# Patient Record
Sex: FEMALE | Race: WHITE | NOT HISPANIC OR LATINO | Employment: OTHER | URBAN - METROPOLITAN AREA
[De-identification: names, ages, dates, MRNs, and addresses within clinical notes are randomized per-mention and may not be internally consistent; named-entity substitution may affect disease eponyms.]

---

## 2018-04-26 ENCOUNTER — DOCUMENTATION (OUTPATIENT)
Dept: ONCOLOGY | Facility: CLINIC | Age: 68
End: 2018-04-26

## 2018-04-26 ENCOUNTER — APPOINTMENT (OUTPATIENT)
Dept: OTHER | Facility: HOSPITAL | Age: 68
End: 2018-04-26

## 2018-04-26 ENCOUNTER — CONSULT (OUTPATIENT)
Dept: ONCOLOGY | Facility: CLINIC | Age: 68
End: 2018-04-26

## 2018-04-26 VITALS
HEIGHT: 63 IN | SYSTOLIC BLOOD PRESSURE: 108 MMHG | HEART RATE: 76 BPM | WEIGHT: 287.9 LBS | TEMPERATURE: 97.7 F | DIASTOLIC BLOOD PRESSURE: 74 MMHG | OXYGEN SATURATION: 93 % | BODY MASS INDEX: 51.01 KG/M2 | RESPIRATION RATE: 18 BRPM

## 2018-04-26 DIAGNOSIS — K95.89 IRON DEFICIENCY ANEMIA FOLLOWING BARIATRIC SURGERY: ICD-10-CM

## 2018-04-26 DIAGNOSIS — D50.8 IRON DEFICIENCY ANEMIA FOLLOWING BARIATRIC SURGERY: ICD-10-CM

## 2018-04-26 DIAGNOSIS — D64.9 ANEMIA, UNSPECIFIED TYPE: Primary | ICD-10-CM

## 2018-04-26 DIAGNOSIS — E53.8 B12 DEFICIENCY: ICD-10-CM

## 2018-04-26 DIAGNOSIS — Z86.718 HISTORY OF DVT IN ADULTHOOD: Primary | ICD-10-CM

## 2018-04-26 LAB
ALBUMIN SERPL-MCNC: 3.8 G/DL (ref 3.5–5.2)
ALBUMIN/GLOB SERPL: 1.5 G/DL
ALP SERPL-CCNC: 90 U/L (ref 39–117)
ALT SERPL W P-5'-P-CCNC: 23 U/L (ref 1–33)
ANION GAP SERPL CALCULATED.3IONS-SCNC: 10.2 MMOL/L
AST SERPL-CCNC: 28 U/L (ref 1–32)
BASOPHILS # BLD AUTO: 0.01 10*3/MM3 (ref 0–0.2)
BASOPHILS NFR BLD AUTO: 0.2 % (ref 0–1.5)
BILIRUB SERPL-MCNC: 0.3 MG/DL (ref 0.1–1.2)
BUN BLD-MCNC: 23 MG/DL (ref 8–23)
BUN/CREAT SERPL: 20.5 (ref 7–25)
CALCIUM SPEC-SCNC: 8.9 MG/DL (ref 8.6–10.5)
CHLORIDE SERPL-SCNC: 103 MMOL/L (ref 98–107)
CO2 SERPL-SCNC: 27.8 MMOL/L (ref 22–29)
CREAT BLD-MCNC: 1.12 MG/DL (ref 0.57–1)
DEPRECATED RDW RBC AUTO: 45.6 FL (ref 37–54)
EOSINOPHIL # BLD AUTO: 0.08 10*3/MM3 (ref 0–0.7)
EOSINOPHIL NFR BLD AUTO: 2 % (ref 0.3–6.2)
ERYTHROCYTE [DISTWIDTH] IN BLOOD BY AUTOMATED COUNT: 13.3 % (ref 11.7–13)
FERRITIN SERPL-MCNC: 101.2 NG/ML (ref 13–150)
FOLATE SERPL-MCNC: 9.02 NG/ML (ref 4.78–24.2)
GFR SERPL CREATININE-BSD FRML MDRD: 49 ML/MIN/1.73
GLOBULIN UR ELPH-MCNC: 2.6 GM/DL
GLUCOSE BLD-MCNC: 62 MG/DL (ref 65–99)
HCT VFR BLD AUTO: 39.8 % (ref 35.6–45.5)
HGB BLD-MCNC: 12.9 G/DL (ref 11.9–15.5)
IMM GRANULOCYTES # BLD: 0.02 10*3/MM3 (ref 0–0.03)
IMM GRANULOCYTES NFR BLD: 0.5 % (ref 0–0.5)
IRON 24H UR-MRATE: 87 MCG/DL (ref 37–145)
IRON SATN MFR SERPL: 24 % (ref 20–50)
LYMPHOCYTES # BLD AUTO: 1.1 10*3/MM3 (ref 0.9–4.8)
LYMPHOCYTES NFR BLD AUTO: 26.9 % (ref 19.6–45.3)
MCH RBC QN AUTO: 29.9 PG (ref 26.9–32)
MCHC RBC AUTO-ENTMCNC: 32.4 G/DL (ref 32.4–36.3)
MCV RBC AUTO: 92.1 FL (ref 80.5–98.2)
MONOCYTES # BLD AUTO: 0.28 10*3/MM3 (ref 0.2–1.2)
MONOCYTES NFR BLD AUTO: 6.8 % (ref 5–12)
NEUTROPHILS # BLD AUTO: 2.6 10*3/MM3 (ref 1.9–8.1)
NEUTROPHILS NFR BLD AUTO: 63.6 % (ref 42.7–76)
NRBC BLD MANUAL-RTO: 0 /100 WBC (ref 0–0)
PLATELET # BLD AUTO: 237 10*3/MM3 (ref 140–500)
PMV BLD AUTO: 10.9 FL (ref 6–12)
POTASSIUM BLD-SCNC: 4.3 MMOL/L (ref 3.5–5.2)
PROT SERPL-MCNC: 6.4 G/DL (ref 6–8.5)
RBC # BLD AUTO: 4.32 10*6/MM3 (ref 3.9–5.2)
SODIUM BLD-SCNC: 141 MMOL/L (ref 136–145)
TIBC SERPL-MCNC: 361 MCG/DL (ref 298–536)
TRANSFERRIN SERPL-MCNC: 242 MG/DL (ref 200–360)
VIT B12 BLD-MCNC: 302 PG/ML (ref 211–946)
WBC NRBC COR # BLD: 4.09 10*3/MM3 (ref 4.5–10.7)

## 2018-04-26 PROCEDURE — 84466 ASSAY OF TRANSFERRIN: CPT | Performed by: INTERNAL MEDICINE

## 2018-04-26 PROCEDURE — 85025 COMPLETE CBC W/AUTO DIFF WBC: CPT | Performed by: INTERNAL MEDICINE

## 2018-04-26 PROCEDURE — 36415 COLL VENOUS BLD VENIPUNCTURE: CPT

## 2018-04-26 PROCEDURE — 83540 ASSAY OF IRON: CPT | Performed by: INTERNAL MEDICINE

## 2018-04-26 PROCEDURE — 82746 ASSAY OF FOLIC ACID SERUM: CPT | Performed by: INTERNAL MEDICINE

## 2018-04-26 PROCEDURE — 82728 ASSAY OF FERRITIN: CPT | Performed by: INTERNAL MEDICINE

## 2018-04-26 PROCEDURE — 82607 VITAMIN B-12: CPT | Performed by: INTERNAL MEDICINE

## 2018-04-26 PROCEDURE — 99205 OFFICE O/P NEW HI 60 MIN: CPT | Performed by: INTERNAL MEDICINE

## 2018-04-26 PROCEDURE — 80053 COMPREHEN METABOLIC PANEL: CPT | Performed by: INTERNAL MEDICINE

## 2018-04-26 RX ORDER — APIXABAN 5 MG/1
TABLET, FILM COATED ORAL
Refills: 5 | COMMUNITY
Start: 2018-04-03 | End: 2018-10-15 | Stop reason: SDUPTHER

## 2018-04-26 RX ORDER — ARIPIPRAZOLE 5 MG/1
10 TABLET ORAL
COMMUNITY
End: 2019-11-01 | Stop reason: DRUGHIGH

## 2018-04-26 RX ORDER — TEMAZEPAM 30 MG/1
CAPSULE ORAL
Refills: 5 | COMMUNITY
Start: 2018-04-23

## 2018-04-26 RX ORDER — CYCLOBENZAPRINE HCL 10 MG
TABLET ORAL
COMMUNITY
Start: 2018-04-24 | End: 2019-04-23

## 2018-04-26 NOTE — PROGRESS NOTES
Staff message rec from Dr Mosley-Pt is having difficulty affording her Eliquis medication. See below.    Sunny Mosley MD sent to Josephine Burton,   I just saw this patient as a new patient today at [place].  She is on Eliquis chronically 5 mg twice a day and has some difficulty affording the medication.  I wondered if you could give her a call and see if we could provide a few samples now and then.     Thanks very much       I will reach out to her and offer assistance.

## 2018-04-26 NOTE — PROGRESS NOTES
I have submitted an Eliquis PA to [facility] through covermymeds. The request has been approved until 12/31/2018.

## 2018-04-26 NOTE — PROGRESS NOTES
Subjective     REASON FOR CONSULTATION:    1.  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  2.  Long-term anticoagulation with Eliquis  3.  Morbid obesity.  Patient is wheelchair bound.  4.  History of prior iron deficiency requiring IV iron therapy in the past.  5.  Previous gastric bypass surgery for obesity.  Provide an opinion on any further workup or treatment                             REQUESTING PHYSICIAN:  Guillermina Wheeler M.D.    RECORDS OBTAINED:  Records of the patients history including those obtained from the referring provider were reviewed and summarized in detail.    HISTORY OF PRESENT ILLNESS:  The patient is a 67 y.o. year old female who is here for an opinion about the above issues.  She has a history of bilateral lower extremity DVTs and pulmonary embolus several years ago.  She is morbidly obese and very immobile and decision was made that she should remain on anticoagulation lifelong.  She had previously taken Xarelto but currently is on Eliquis 5 mg po BID and seems to be doing well with no further history of recent thrombosis or any significant bleeding issues.  She had previously been followed at a different hematology practice but is transferring her care to our office.    She also has a history of iron deficiency anemia in the setting of previous gastric bypass surgery for obesity.  She has required IV iron therapy in the past.  On today's visit, her hemoglobin is normal at 12.9 g/dL and her MCV is normal at 92.1.  We will be drawing baseline iron studies and B12 level as part of the visit today.    She is accompanied to the office today by her .  She is in no distress but she is wheelchair bound.  Her only complaint is of some occasional tenderness in the left lower extremity.  On exam it appears that she has some venous insufficiency.  She does not have any palpable cord and no pitting edema of the ankle.    History of Present Illness     Past Medical History:    Diagnosis Date   • Allergy    • Anemia    • Arthritis    • Asthma    • Bleeding disorder    • Depression    • Excessive daytime sleepiness    • Fibromyalgia    • H/O DVT (deep venous thrombosis)    • H/O TIA (transient ischemic attack)    • Head trauma    • Migraine    • Panic attacks    • Poor sleep    • Sleep apnea    • Stroke         Past Surgical History:   Procedure Laterality Date   • GASTRIC BYPASS     • HUMERUS CLOSED REDUCTION Right 09/2016    proximal end and head        Current Outpatient Prescriptions on File Prior to Visit   Medication Sig Dispense Refill   • clonazePAM (KlonoPIN) 0.5 MG tablet TK 1 T PO TID  3   • FLUoxetine (PROzac) 40 MG capsule TK ONE C PO  QAM  2   • gabapentin (NEURONTIN) 100 MG capsule Take 100 mg by mouth 2 (two) times a day.     • nefazodone (SERZONE) 250 MG tablet TK 3 TS PO QHS  1   • Oxycodone-Acetaminophen (PERCOCET PO) Take  by mouth.     • pravastatin (PRAVACHOL) 20 MG tablet TK 1 T PO  QHS  1   • traZODone (DESYREL) 50 MG tablet TK 1 T PO  QHS  4   • [DISCONTINUED] ADVAIR DISKUS 250-50 MCG/DOSE DISKUS INHALE 1 PUFF PO BID  5   • [DISCONTINUED] HYDROcodone-acetaminophen (NORCO)  MG per tablet TK 1 T PO QID PRN P  0   • [DISCONTINUED] hydroxychloroquine (PLAQUENIL) 200 MG tablet TK 1 T PO  BID  3   • [DISCONTINUED] methocarbamol (ROBAXIN) 500 MG tablet Take 500 mg by mouth 3 (three) times a day.     • [DISCONTINUED] QUEtiapine (SEROquel) 50 MG tablet TK 1 T PO  QHS  2   • [DISCONTINUED] rivaroxaban (XARELTO) 20 MG tablet Take 20 mg by mouth daily.     • [DISCONTINUED] VENTOLIN  (90 BASE) MCG/ACT inhaler INHALE 1 TO 2 PUFFS PO Q 4 TO 6 H PRN AND UTD  0   • [DISCONTINUED] zolpidem (AMBIEN) 10 MG tablet TAKE 1 TABLET BY MOUTH EVERY NIGHT AT BEDTIME  4     No current facility-administered medications on file prior to visit.         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 Negative for activity change, chills and fever.   HENT: Positive for hearing loss. Negative for mouth sores, trouble swallowing and voice change.    Eyes: Negative for pain and visual disturbance.   Respiratory: Positive for cough and shortness of breath. Negative for wheezing.    Cardiovascular: Negative for chest pain and palpitations.   Gastrointestinal: Negative for abdominal pain, constipation, diarrhea, nausea and vomiting.   Endocrine: Positive for cold intolerance.   Genitourinary: Negative for difficulty urinating, frequency and urgency.   Musculoskeletal: Positive for arthralgias, back pain and myalgias. Negative for joint swelling.   Skin: Negative for rash.   Neurological: Positive for headaches. Negative for dizziness, seizures and weakness.   Hematological: Negative for adenopathy. Does not bruise/bleed easily.   Psychiatric/Behavioral: Negative for behavioral problems and confusion. The patient is not nervous/anxious.         Objective     Vitals:    04/26/18 0926   BP: 108/74   Pulse: 76   Resp: 18   Temp: 97.7 °F (36.5 °C)   TempSrc: Oral   SpO2: 93%   Weight: 131 kg (287 lb 14.4 oz)   Height: 160 cm (62.99\")  Comment: states height   PainSc:   7   PainLoc: Back  Comment: going down to hips     Current Status 4/26/2018   ECOG score 1       Physical Exam   Constitutional: She is oriented to person, place, and time. She " appears well-developed and well-nourished. No distress.   Morbidly obese female seated in a wheelchair in no acute distress.   HENT:   Head: Normocephalic.   Eyes: Conjunctivae and EOM are normal. Pupils are equal, round, and reactive to light. No scleral icterus.   Neck: Normal range of motion. Neck supple. No JVD present. No thyromegaly present.   Cardiovascular: Normal rate and regular rhythm.  Exam reveals no gallop and no friction rub.    No murmur heard.  Pulmonary/Chest: Effort normal. She has no wheezes. She has rales.   Abdominal: Soft. She exhibits no distension and no mass. There is no tenderness.   Musculoskeletal: Normal range of motion. She exhibits no edema or deformity.   Lymphadenopathy:     She has no cervical adenopathy.   Neurological: She is alert and oriented to person, place, and time. She has normal reflexes. No cranial nerve deficit.   Skin: Skin is warm and dry. No rash noted. No erythema.   Psychiatric: Her behavior is normal. Judgment normal.   Flat affect         RECENT LABS:  Hematology WBC   Date Value Ref Range Status   04/26/2018 4.09 (L) 4.50 - 10.70 10*3/mm3 Final     RBC   Date Value Ref Range Status   04/26/2018 4.32 3.90 - 5.20 10*6/mm3 Final     Hemoglobin   Date Value Ref Range Status   04/26/2018 12.9 11.9 - 15.5 g/dL Final     Hematocrit   Date Value Ref Range Status   04/26/2018 39.8 35.6 - 45.5 % Final     Platelets   Date Value Ref Range Status   04/26/2018 237 140 - 500 10*3/mm3 Final          Assessment/Plan   1.  Lifelong anticoagulation due to prior history of deep vein thromboses and very poor mobility and sedentary lifestyle.  She currently seems to be doing well on 5 mg of  Eliquis twice daily.  2.  Prior history of iron deficiency requiring IV iron therapy in the setting of previous gastric bypass surgery.  Her hemoglobin is normal today but we will order a baseline iron studies and a B12 level.  3.  Morbid obesity.  Patient is wheelchair bound.  4.  Mild  leukopenia     Recommendations  1.  We will draw additional lab studies from the office today including iron panel, ferritin, B12, and folate level.  2.  We will review these lab results become available and contact the patient if it appears that she does have any current deficiency state.  3.  We reviewed her extensive previous medical records as part of this visit.  4.  We willplan to continue anticoagulation long-term with Eliquis 5 mg po BID  5.  We will schedule routine follow-up in our office every 6 months with repeat labs at that time to include iron studies and B12 and CBC.  In the setting of previous gastric bypass surgery we suspect that she may require additional iron replacement and possible parenteral B12 replacement in the future.  6.  The patient reports that she has had some difficulty affording the medication and we will put her in contact with Josephine Montejo at our [facility] office to see if there may be some options to decrease her financial toxicity.

## 2018-10-11 ENCOUNTER — APPOINTMENT (OUTPATIENT)
Dept: ONCOLOGY | Facility: CLINIC | Age: 68
End: 2018-10-11

## 2018-10-11 ENCOUNTER — APPOINTMENT (OUTPATIENT)
Dept: OTHER | Facility: HOSPITAL | Age: 68
End: 2018-10-11

## 2018-10-15 RX ORDER — APIXABAN 5 MG/1
5 TABLET, FILM COATED ORAL 2 TIMES DAILY
Qty: 60 TABLET | Refills: 1 | Status: SHIPPED | OUTPATIENT
Start: 2018-10-15 | End: 2018-12-12 | Stop reason: SDUPTHER

## 2018-10-23 ENCOUNTER — OFFICE VISIT (OUTPATIENT)
Dept: ONCOLOGY | Facility: CLINIC | Age: 68
End: 2018-10-23

## 2018-10-23 ENCOUNTER — LAB (OUTPATIENT)
Dept: OTHER | Facility: HOSPITAL | Age: 68
End: 2018-10-23

## 2018-10-23 VITALS
OXYGEN SATURATION: 93 % | RESPIRATION RATE: 16 BRPM | TEMPERATURE: 97.7 F | SYSTOLIC BLOOD PRESSURE: 106 MMHG | DIASTOLIC BLOOD PRESSURE: 65 MMHG | HEIGHT: 63 IN | HEART RATE: 56 BPM | BODY MASS INDEX: 51.91 KG/M2 | WEIGHT: 293 LBS

## 2018-10-23 DIAGNOSIS — Z86.718 HISTORY OF DVT IN ADULTHOOD: ICD-10-CM

## 2018-10-23 DIAGNOSIS — E53.8 B12 DEFICIENCY: ICD-10-CM

## 2018-10-23 DIAGNOSIS — D50.8 IRON DEFICIENCY ANEMIA FOLLOWING BARIATRIC SURGERY: ICD-10-CM

## 2018-10-23 DIAGNOSIS — D50.8 IRON DEFICIENCY ANEMIA FOLLOWING BARIATRIC SURGERY: Primary | ICD-10-CM

## 2018-10-23 DIAGNOSIS — K95.89 IRON DEFICIENCY ANEMIA FOLLOWING BARIATRIC SURGERY: ICD-10-CM

## 2018-10-23 DIAGNOSIS — K95.89 IRON DEFICIENCY ANEMIA FOLLOWING BARIATRIC SURGERY: Primary | ICD-10-CM

## 2018-10-23 LAB
BASOPHILS # BLD AUTO: 0.01 10*3/MM3 (ref 0–0.2)
BASOPHILS NFR BLD AUTO: 0.2 % (ref 0–1.5)
DEPRECATED RDW RBC AUTO: 47.6 FL (ref 37–54)
EOSINOPHIL # BLD AUTO: 0.11 10*3/MM3 (ref 0–0.7)
EOSINOPHIL NFR BLD AUTO: 2.5 % (ref 0.3–6.2)
ERYTHROCYTE [DISTWIDTH] IN BLOOD BY AUTOMATED COUNT: 13.6 % (ref 11.7–13)
FERRITIN SERPL-MCNC: 67.4 NG/ML (ref 13–150)
HCT VFR BLD AUTO: 40.3 % (ref 35.6–45.5)
HGB BLD-MCNC: 12.5 G/DL (ref 11.9–15.5)
IMM GRANULOCYTES # BLD: 0.03 10*3/MM3 (ref 0–0.03)
IMM GRANULOCYTES NFR BLD: 0.7 % (ref 0–0.5)
IRON 24H UR-MRATE: 84 MCG/DL (ref 37–145)
IRON SATN MFR SERPL: 22 % (ref 20–50)
LYMPHOCYTES # BLD AUTO: 1.15 10*3/MM3 (ref 0.9–4.8)
LYMPHOCYTES NFR BLD AUTO: 25.7 % (ref 19.6–45.3)
MCH RBC QN AUTO: 29.3 PG (ref 26.9–32)
MCHC RBC AUTO-ENTMCNC: 31 G/DL (ref 32.4–36.3)
MCV RBC AUTO: 94.6 FL (ref 80.5–98.2)
MONOCYTES # BLD AUTO: 0.37 10*3/MM3 (ref 0.2–1.2)
MONOCYTES NFR BLD AUTO: 8.3 % (ref 5–12)
NEUTROPHILS # BLD AUTO: 2.81 10*3/MM3 (ref 1.9–8.1)
NEUTROPHILS NFR BLD AUTO: 62.6 % (ref 42.7–76)
NRBC BLD MANUAL-RTO: 0 /100 WBC (ref 0–0)
PLATELET # BLD AUTO: 249 10*3/MM3 (ref 140–500)
PMV BLD AUTO: 10.4 FL (ref 6–12)
RBC # BLD AUTO: 4.26 10*6/MM3 (ref 3.9–5.2)
TIBC SERPL-MCNC: 378 MCG/DL (ref 298–536)
TRANSFERRIN SERPL-MCNC: 254 MG/DL (ref 200–360)
VIT B12 BLD-MCNC: 267 PG/ML (ref 211–946)
WBC NRBC COR # BLD: 4.48 10*3/MM3 (ref 4.5–10.7)

## 2018-10-23 PROCEDURE — 82607 VITAMIN B-12: CPT | Performed by: INTERNAL MEDICINE

## 2018-10-23 PROCEDURE — 83540 ASSAY OF IRON: CPT | Performed by: INTERNAL MEDICINE

## 2018-10-23 PROCEDURE — 82728 ASSAY OF FERRITIN: CPT | Performed by: INTERNAL MEDICINE

## 2018-10-23 PROCEDURE — 36415 COLL VENOUS BLD VENIPUNCTURE: CPT

## 2018-10-23 PROCEDURE — 85025 COMPLETE CBC W/AUTO DIFF WBC: CPT | Performed by: INTERNAL MEDICINE

## 2018-10-23 PROCEDURE — 99214 OFFICE O/P EST MOD 30 MIN: CPT | Performed by: INTERNAL MEDICINE

## 2018-10-23 PROCEDURE — 84466 ASSAY OF TRANSFERRIN: CPT | Performed by: INTERNAL MEDICINE

## 2018-10-23 NOTE — PROGRESS NOTES
Subjective     REASON FOR FOLLOW UP:    1.  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  2.  Long-term anticoagulation with Eliquis  3.  Morbid obesity.  Patient is wheelchair bound.  4.  History of prior iron deficiency requiring IV iron therapy in the past.  5.  Previous gastric bypass surgery for obesity.    HISTORY OF PRESENT ILLNESS:  The patient is a 67 y.o. year old female who has a history of bilateral lower extremity DVTs and pulmonary embolus several years ago.  She is morbidly obese and very immobile and decision was made that she should remain on anticoagulation lifelong.  She had previously taken Xarelto but currently is on Eliquis 5 mg po BID and seems to be doing well with no further history of recent thrombosis or any significant bleeding issues.  She had previously been followed at a different hematology practice but is transferring her care to our office.    She also has a history of iron deficiency anemia in the setting of previous gastric bypass surgery for obesity.  She has required IV iron therapy in the past. She is accompanied to the office today by her .  She is in no distress but she is wheelchair bound.  Her hemoglobin today is stable at 12.5 g/dL    History of Present Illness     Past Medical History:   Diagnosis Date   • Allergy    • Anemia    • Arthritis    • Asthma    • Bleeding disorder (CMS/HCC)    • Depression    • Excessive daytime sleepiness    • Fibromyalgia    • H/O DVT (deep venous thrombosis)    • H/O TIA (transient ischemic attack)    • Head trauma    • Migraine    • Panic attacks    • Poor sleep    • Poor vision    • Sleep apnea    • Stroke (CMS/HCC)         Past Surgical History:   Procedure Laterality Date   • GASTRIC BYPASS     • HUMERUS CLOSED REDUCTION Right 09/2016    proximal end and head        Current Outpatient Prescriptions on File Prior to Visit   Medication Sig Dispense Refill   • ARIPiprazole (ABILIFY) 5 MG tablet Take 5 mg by  mouth.     • clonazePAM (KlonoPIN) 0.5 MG tablet TK 1 T PO TID  3   • cyclobenzaprine (FLEXERIL) 10 MG tablet      • ELIQUIS 5 MG tablet tablet Take 1 tablet by mouth 2 (Two) Times a Day. 60 tablet 1   • FLUoxetine (PROzac) 40 MG capsule TK ONE C PO  QAM  2   • gabapentin (NEURONTIN) 100 MG capsule Take 100 mg by mouth 2 (two) times a day.     • nefazodone (SERZONE) 250 MG tablet TK 3 TS PO QHS  1   • Oxycodone-Acetaminophen (PERCOCET PO) Take  by mouth.     • pravastatin (PRAVACHOL) 20 MG tablet TK 1 T PO  QHS  1   • temazepam (RESTORIL) 30 MG capsule TK ONE C PO  QHS  5   • traZODone (DESYREL) 50 MG tablet TK 1 T PO  QHS  4     No current facility-administered medications on file prior to visit.         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 Negative for activity change, chills and fever.   HENT: Positive for hearing loss. Negative for mouth sores, trouble swallowing and voice change.    Eyes: Negative for pain and visual disturbance.   Respiratory: Positive for cough and shortness of breath. Negative for wheezing.    Cardiovascular: Negative for chest pain and palpitations.   Gastrointestinal: Negative for abdominal pain, constipation, diarrhea,  "nausea and vomiting.   Endocrine: Positive for cold intolerance.   Genitourinary: Negative for difficulty urinating, frequency and urgency.   Musculoskeletal: Positive for arthralgias, back pain and myalgias. Negative for joint swelling.   Skin: Negative for rash.   Neurological: Positive for headaches. Negative for dizziness, seizures and weakness.   Hematological: Negative for adenopathy. Does not bruise/bleed easily.   Psychiatric/Behavioral: Negative for behavioral problems and confusion. The patient is not nervous/anxious.         Objective     Vitals:    10/23/18 1110   BP: 106/65  Comment: WRIST   Pulse: 56   Resp: 16   Temp: 97.7 °F (36.5 °C)   TempSrc: Oral   SpO2: 93%   Weight: 134 kg (295 lb)   Height: 160 cm (62.99\")   PainSc:   6   PainLoc: Back     Current Status 10/23/2018   ECOG score 2       Physical Exam   Constitutional: She is oriented to person, place, and time. She appears well-developed and well-nourished. No distress.   Morbidly obese female seated in a wheelchair in no acute distress.   HENT:   Head: Normocephalic.   Eyes: Pupils are equal, round, and reactive to light. Conjunctivae and EOM are normal. No scleral icterus.   Neck: Normal range of motion. Neck supple. No JVD present. No thyromegaly present.   Cardiovascular: Normal rate and regular rhythm.  Exam reveals no gallop and no friction rub.    No murmur heard.  Pulmonary/Chest: Effort normal. She has no wheezes. She has rales.   Abdominal: Soft. She exhibits no distension and no mass. There is no tenderness.   Musculoskeletal: Normal range of motion. She exhibits no edema or deformity.   Lymphadenopathy:     She has no cervical adenopathy.   Neurological: She is alert and oriented to person, place, and time. She has normal reflexes. No cranial nerve deficit.   Skin: Skin is warm and dry. No rash noted. No erythema.   Psychiatric: Her behavior is normal. Judgment normal.   Flat affect         RECENT LABS:  Hematology WBC   Date Value " Ref Range Status   10/23/2018 4.48 (L) 4.50 - 10.70 10*3/mm3 Final     RBC   Date Value Ref Range Status   10/23/2018 4.26 3.90 - 5.20 10*6/mm3 Final     Hemoglobin   Date Value Ref Range Status   10/23/2018 12.5 11.9 - 15.5 g/dL Final     Hematocrit   Date Value Ref Range Status   10/23/2018 40.3 35.6 - 45.5 % Final     Platelets   Date Value Ref Range Status   10/23/2018 249 140 - 500 10*3/mm3 Final          Assessment/Plan   1.  Lifelong anticoagulation due to prior history of deep vein thromboses and very poor mobility and sedentary lifestyle.  She currently seems to be doing well on 5 mg of  Eliquis twice daily.  2.  Prior history of iron deficiency requiring IV iron therapy in the setting of previous gastric bypass surgery.    3.  Morbid obesity.  Patient is wheelchair bound.  4.  Mild leukopenia     Recommendations  1.  We will review lab results from the office today including iron panel, ferritin, and B12 when they become available.  2.  We will contact the patient if it appears that she does have any current deficiency state.  3.  Continue anticoagulation long-term with Eliquis 5 mg po BID  4.  We will schedule routine follow-up in our office every 6 months with repeat labs at that time to include iron studies and B12 and CBC.  In the setting of previous gastric bypass surgery we suspect that she may require additional iron replacement and possible parenteral B12 replacement in the future.

## 2018-12-12 RX ORDER — APIXABAN 5 MG/1
TABLET, FILM COATED ORAL
Qty: 60 TABLET | Refills: 5 | Status: SHIPPED | OUTPATIENT
Start: 2018-12-12 | End: 2019-06-21 | Stop reason: SDUPTHER

## 2019-04-09 ENCOUNTER — APPOINTMENT (OUTPATIENT)
Dept: OTHER | Facility: HOSPITAL | Age: 69
End: 2019-04-09

## 2019-04-09 ENCOUNTER — APPOINTMENT (OUTPATIENT)
Dept: ONCOLOGY | Facility: CLINIC | Age: 69
End: 2019-04-09

## 2019-04-23 ENCOUNTER — LAB (OUTPATIENT)
Dept: OTHER | Facility: HOSPITAL | Age: 69
End: 2019-04-23

## 2019-04-23 ENCOUNTER — OFFICE VISIT (OUTPATIENT)
Dept: ONCOLOGY | Facility: CLINIC | Age: 69
End: 2019-04-23

## 2019-04-23 VITALS
HEIGHT: 63 IN | TEMPERATURE: 98 F | SYSTOLIC BLOOD PRESSURE: 131 MMHG | RESPIRATION RATE: 14 BRPM | BODY MASS INDEX: 51.91 KG/M2 | OXYGEN SATURATION: 94 % | WEIGHT: 293 LBS | HEART RATE: 57 BPM | DIASTOLIC BLOOD PRESSURE: 75 MMHG

## 2019-04-23 DIAGNOSIS — D50.8 IRON DEFICIENCY ANEMIA FOLLOWING BARIATRIC SURGERY: ICD-10-CM

## 2019-04-23 DIAGNOSIS — K95.89 IRON DEFICIENCY ANEMIA FOLLOWING BARIATRIC SURGERY: ICD-10-CM

## 2019-04-23 DIAGNOSIS — Z86.718 HISTORY OF DVT IN ADULTHOOD: ICD-10-CM

## 2019-04-23 DIAGNOSIS — E53.8 B12 DEFICIENCY: ICD-10-CM

## 2019-04-23 DIAGNOSIS — E53.8 B12 DEFICIENCY: Primary | ICD-10-CM

## 2019-04-23 LAB
BASOPHILS # BLD AUTO: 0.02 10*3/MM3 (ref 0–0.2)
BASOPHILS NFR BLD AUTO: 0.5 % (ref 0–1.5)
DEPRECATED RDW RBC AUTO: 46.3 FL (ref 37–54)
EOSINOPHIL # BLD AUTO: 0.07 10*3/MM3 (ref 0–0.4)
EOSINOPHIL NFR BLD AUTO: 1.8 % (ref 0.3–6.2)
ERYTHROCYTE [DISTWIDTH] IN BLOOD BY AUTOMATED COUNT: 13.3 % (ref 12.3–15.4)
FERRITIN SERPL-MCNC: 22.7 NG/ML (ref 13–150)
HCT VFR BLD AUTO: 35.9 % (ref 34–46.6)
HGB BLD-MCNC: 11.4 G/DL (ref 12–15.9)
IMM GRANULOCYTES # BLD AUTO: 0.02 10*3/MM3 (ref 0–0.05)
IMM GRANULOCYTES NFR BLD AUTO: 0.5 % (ref 0–0.5)
IRON 24H UR-MRATE: 76 MCG/DL (ref 37–145)
IRON SATN MFR SERPL: 20 % (ref 20–50)
LYMPHOCYTES # BLD AUTO: 0.91 10*3/MM3 (ref 0.7–3.1)
LYMPHOCYTES NFR BLD AUTO: 23.2 % (ref 19.6–45.3)
MCH RBC QN AUTO: 30.2 PG (ref 26.6–33)
MCHC RBC AUTO-ENTMCNC: 31.8 G/DL (ref 31.5–35.7)
MCV RBC AUTO: 95 FL (ref 79–97)
MONOCYTES # BLD AUTO: 0.33 10*3/MM3 (ref 0.1–0.9)
MONOCYTES NFR BLD AUTO: 8.4 % (ref 5–12)
NEUTROPHILS # BLD AUTO: 2.58 10*3/MM3 (ref 1.7–7)
NEUTROPHILS NFR BLD AUTO: 65.6 % (ref 42.7–76)
NRBC BLD AUTO-RTO: 0 /100 WBC (ref 0–0.2)
PLATELET # BLD AUTO: 196 10*3/MM3 (ref 140–450)
PMV BLD AUTO: 10.7 FL (ref 6–12)
RBC # BLD AUTO: 3.78 10*6/MM3 (ref 3.77–5.28)
TIBC SERPL-MCNC: 383 MCG/DL (ref 298–536)
TRANSFERRIN SERPL-MCNC: 257 MG/DL (ref 200–360)
VIT B12 BLD-MCNC: 247 PG/ML (ref 211–946)
WBC NRBC COR # BLD: 3.93 10*3/MM3 (ref 3.4–10.8)

## 2019-04-23 PROCEDURE — 82607 VITAMIN B-12: CPT | Performed by: INTERNAL MEDICINE

## 2019-04-23 PROCEDURE — 36415 COLL VENOUS BLD VENIPUNCTURE: CPT

## 2019-04-23 PROCEDURE — 82728 ASSAY OF FERRITIN: CPT | Performed by: INTERNAL MEDICINE

## 2019-04-23 PROCEDURE — 99214 OFFICE O/P EST MOD 30 MIN: CPT | Performed by: INTERNAL MEDICINE

## 2019-04-23 PROCEDURE — 83540 ASSAY OF IRON: CPT | Performed by: INTERNAL MEDICINE

## 2019-04-23 PROCEDURE — 84466 ASSAY OF TRANSFERRIN: CPT | Performed by: INTERNAL MEDICINE

## 2019-04-23 PROCEDURE — 85025 COMPLETE CBC W/AUTO DIFF WBC: CPT | Performed by: INTERNAL MEDICINE

## 2019-04-23 RX ORDER — ALBUTEROL SULFATE 90 UG/1
AEROSOL, METERED RESPIRATORY (INHALATION)
COMMUNITY

## 2019-04-23 RX ORDER — METHOCARBAMOL 750 MG/1
TABLET, FILM COATED ORAL
COMMUNITY

## 2019-04-23 NOTE — PROGRESS NOTES
Subjective     REASON FOR FOLLOW UP:    1.  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  2.  Long-term anticoagulation with Eliquis  3.  Morbid obesity.  Patient is wheelchair bound.  4.  History of prior iron deficiency requiring IV iron therapy in the past.  5.  Previous gastric bypass surgery for obesity.    HISTORY OF PRESENT ILLNESS:  The patient is a 68 y.o. year old female who has a history of bilateral lower extremity DVTs and pulmonary embolus several years ago.  She is morbidly obese and very immobile and decision was made that she should remain on anticoagulation lifelong.  She had previously taken Xarelto but currently is on Eliquis 5 mg po BID and seems to be doing well with no further history of recent thrombosis or any significant bleeding issues.  She had previously been followed at a different hematology practice but is transferring her care to our office.    She also has a history of iron deficiency anemia in the setting of previous gastric bypass surgery for obesity.  She has required IV iron therapy in the past. She is accompanied to the office today by her .  She is in no distress but she is wheelchair bound.  Her hemoglobin today is slightly lower at 11.4 g/dL.  Her repeat iron studies and ferritin are pending.    She reports that she is feeling fine.  She has not had any major health issues although she did have an ER visit for pain behind the knee but Doppler study at that time was negative.  She also has been undergoing some steroid epidural injections for chronic back pain.  History of Present Illness     Past Medical History:   Diagnosis Date   • Allergy    • Anemia    • Arthritis    • Asthma    • Bleeding disorder (CMS/HCC)    • Depression    • Excessive daytime sleepiness    • Fibromyalgia    • H/O DVT (deep venous thrombosis)    • H/O TIA (transient ischemic attack)    • Head trauma    • Migraine    • Panic attacks    • Poor sleep    • Poor vision    •  Sleep apnea    • Stroke (CMS/HCC)         Past Surgical History:   Procedure Laterality Date   • GASTRIC BYPASS     • HUMERUS CLOSED REDUCTION Right 2016    proximal end and head        Current Outpatient Medications on File Prior to Visit   Medication Sig Dispense Refill   • albuterol sulfate HFA (VENTOLIN HFA) 108 (90 Base) MCG/ACT inhaler Ventolin HFA 90 mcg/actuation aerosol inhaler     • ARIPiprazole (ABILIFY) 5 MG tablet Take 10 mg by mouth.     • clonazePAM (KlonoPIN) 0.5 MG tablet TK 1 T PO TID  3   • ELIQUIS 5 MG tablet tablet TAKE 1 TABLET BY MOUTH TWICE DAILY 60 tablet 5   • FLUoxetine (PROzac) 40 MG capsule TK ONE C PO  QAM  2   • gabapentin (NEURONTIN) 100 MG capsule Take 400 mg by mouth.     • methocarbamol (ROBAXIN) 750 MG tablet methocarbamol 750 mg tablet   TAKE 1 TABLET BY MOUTH FOUR TIMES DAILY AS DIRECTED     • Oxycodone-Acetaminophen (PERCOCET PO) Take  by mouth.     • pravastatin (PRAVACHOL) 20 MG tablet TK 1 T PO  QHS  1   • temazepam (RESTORIL) 30 MG capsule TK ONE C PO  QHS  5   • traZODone (DESYREL) 50 MG tablet 100 mg. TK 1 T PO  QHS  4   • [DISCONTINUED] cyclobenzaprine (FLEXERIL) 10 MG tablet      • [DISCONTINUED] nefazodone (SERZONE) 250 MG tablet TK 3 TS PO QHS  1     No current facility-administered medications on file prior to visit.         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 Negative for activity change, chills and fever.   HENT: Positive for hearing loss. Negative for mouth sores, trouble swallowing and voice change.    Eyes: Negative for pain and visual disturbance.   Respiratory: Positive for cough and shortness of breath. Negative for wheezing.    Cardiovascular: Negative for chest pain and palpitations.   Gastrointestinal: Negative for abdominal pain, constipation, diarrhea, nausea and vomiting.   Endocrine: Positive for cold intolerance.   Genitourinary: Negative for difficulty urinating, frequency and urgency.   Musculoskeletal: Positive for arthralgias, back pain and myalgias. Negative for joint swelling.   Skin: Negative for rash.   Neurological: Positive for headaches. Negative for dizziness, seizures and weakness.   Hematological: Negative for adenopathy. Does not bruise/bleed easily.   Psychiatric/Behavioral: Negative for behavioral problems and confusion. The patient is not nervous/anxious.         Objective     Vitals:    04/23/19 1135   BP: 131/75   Pulse: 57   Resp: 14   Temp: 98 °F (36.7 °C)   SpO2: 94%   Weight: (!) 138 kg (303 lb 6.4 oz)   Height: 160 cm (62.99\")   PainSc:   7   PainLoc: Back     Current Status 4/23/2019   ECOG score 2       Physical Exam   Constitutional: She is oriented to person, place, and time. She appears well-developed and well-nourished. No distress.   Morbidly obese female seated in a wheelchair in no acute distress.   HENT:   Head: Normocephalic.   Eyes: Conjunctivae and EOM are normal. Pupils are equal, round, and reactive to light. No scleral icterus.   Neck: Normal range of motion. Neck supple. No JVD present. No thyromegaly present.   Cardiovascular: Normal rate and regular " rhythm. Exam reveals no gallop and no friction rub.   No murmur heard.  Pulmonary/Chest: Effort normal. She has no wheezes. She has rales.   Abdominal: Soft. She exhibits no distension and no mass. There is no tenderness.   Musculoskeletal: Normal range of motion. She exhibits no edema or deformity.   Lymphadenopathy:     She has no cervical adenopathy.   Neurological: She is alert and oriented to person, place, and time. She has normal reflexes. No cranial nerve deficit.   Skin: Skin is warm and dry. No rash noted. No erythema.   Psychiatric: Her behavior is normal. Judgment normal.   Flat affect         RECENT LABS:  Hematology WBC   Date Value Ref Range Status   04/23/2019 3.93 3.40 - 10.80 10*3/mm3 Final     RBC   Date Value Ref Range Status   04/23/2019 3.78 3.77 - 5.28 10*6/mm3 Final     Hemoglobin   Date Value Ref Range Status   04/23/2019 11.4 (L) 12.0 - 15.9 g/dL Final     Hematocrit   Date Value Ref Range Status   04/23/2019 35.9 34.0 - 46.6 % Final     Platelets   Date Value Ref Range Status   04/23/2019 196 140 - 450 10*3/mm3 Final          Assessment/Plan   1.  Lifelong anticoagulation due to prior history of deep vein thromboses and very poor mobility and sedentary lifestyle.  She currently seems to be doing well on 5 mg of  Eliquis twice daily.  2.  Prior history of iron deficiency requiring IV iron therapy in the setting of previous gastric bypass surgery.    3.  Morbid obesity.  Patient is wheelchair bound.  4.  Mild leukopenia     Recommendations  1.  We will review lab results from the office today including iron panel, ferritin, and B12 when they become available.  2.  We will contact the patient if it appears that she does have any current deficiency state.  3.  Continue anticoagulation long-term with Eliquis 5 mg po BID  4.  We will schedule routine follow-up in our office every 6 months with repeat labs at that time to include iron studies and B12 and CBC.  In the setting of previous gastric  bypass surgery we suspect that she may require additional iron replacement and possible parenteral B12 replacement in the future.

## 2019-06-21 RX ORDER — APIXABAN 5 MG/1
TABLET, FILM COATED ORAL
Qty: 60 TABLET | Refills: 5 | Status: SHIPPED | OUTPATIENT
Start: 2019-06-21 | End: 2019-12-10 | Stop reason: SDUPTHER

## 2019-10-22 ENCOUNTER — APPOINTMENT (OUTPATIENT)
Dept: ONCOLOGY | Facility: CLINIC | Age: 69
End: 2019-10-22

## 2019-10-22 ENCOUNTER — APPOINTMENT (OUTPATIENT)
Dept: OTHER | Facility: HOSPITAL | Age: 69
End: 2019-10-22

## 2019-11-01 ENCOUNTER — OFFICE VISIT (OUTPATIENT)
Dept: ONCOLOGY | Facility: CLINIC | Age: 69
End: 2019-11-01

## 2019-11-01 ENCOUNTER — LAB (OUTPATIENT)
Dept: OTHER | Facility: HOSPITAL | Age: 69
End: 2019-11-01

## 2019-11-01 VITALS
HEIGHT: 63 IN | WEIGHT: 293 LBS | BODY MASS INDEX: 51.91 KG/M2 | TEMPERATURE: 97.4 F | RESPIRATION RATE: 18 BRPM | OXYGEN SATURATION: 93 % | SYSTOLIC BLOOD PRESSURE: 137 MMHG | DIASTOLIC BLOOD PRESSURE: 72 MMHG | HEART RATE: 59 BPM

## 2019-11-01 DIAGNOSIS — K95.89 IRON DEFICIENCY ANEMIA FOLLOWING BARIATRIC SURGERY: Primary | ICD-10-CM

## 2019-11-01 DIAGNOSIS — K95.89 IRON DEFICIENCY ANEMIA FOLLOWING BARIATRIC SURGERY: ICD-10-CM

## 2019-11-01 DIAGNOSIS — D50.8 IRON DEFICIENCY ANEMIA FOLLOWING BARIATRIC SURGERY: ICD-10-CM

## 2019-11-01 DIAGNOSIS — E53.8 B12 DEFICIENCY: ICD-10-CM

## 2019-11-01 DIAGNOSIS — Z86.718 HISTORY OF DVT IN ADULTHOOD: ICD-10-CM

## 2019-11-01 DIAGNOSIS — D50.8 IRON DEFICIENCY ANEMIA FOLLOWING BARIATRIC SURGERY: Primary | ICD-10-CM

## 2019-11-01 LAB
BASOPHILS # BLD AUTO: 0.03 10*3/MM3 (ref 0–0.2)
BASOPHILS NFR BLD AUTO: 0.6 % (ref 0–1.5)
DEPRECATED RDW RBC AUTO: 49.9 FL (ref 37–54)
EOSINOPHIL # BLD AUTO: 0.11 10*3/MM3 (ref 0–0.4)
EOSINOPHIL NFR BLD AUTO: 2.4 % (ref 0.3–6.2)
ERYTHROCYTE [DISTWIDTH] IN BLOOD BY AUTOMATED COUNT: 14.1 % (ref 12.3–15.4)
FERRITIN SERPL-MCNC: 15.3 NG/ML (ref 13–150)
FOLATE SERPL-MCNC: 6.38 NG/ML (ref 4.78–24.2)
HCT VFR BLD AUTO: 37 % (ref 34–46.6)
HGB BLD-MCNC: 11.6 G/DL (ref 12–15.9)
IMM GRANULOCYTES # BLD AUTO: 0.01 10*3/MM3 (ref 0–0.05)
IMM GRANULOCYTES NFR BLD AUTO: 0.2 % (ref 0–0.5)
IRON 24H UR-MRATE: 74 MCG/DL (ref 37–145)
IRON SATN MFR SERPL: 16 % (ref 20–50)
LYMPHOCYTES # BLD AUTO: 1.23 10*3/MM3 (ref 0.7–3.1)
LYMPHOCYTES NFR BLD AUTO: 26.3 % (ref 19.6–45.3)
MCH RBC QN AUTO: 30.2 PG (ref 26.6–33)
MCHC RBC AUTO-ENTMCNC: 31.4 G/DL (ref 31.5–35.7)
MCV RBC AUTO: 96.4 FL (ref 79–97)
MONOCYTES # BLD AUTO: 0.43 10*3/MM3 (ref 0.1–0.9)
MONOCYTES NFR BLD AUTO: 9.2 % (ref 5–12)
NEUTROPHILS # BLD AUTO: 2.87 10*3/MM3 (ref 1.7–7)
NEUTROPHILS NFR BLD AUTO: 61.3 % (ref 42.7–76)
NRBC BLD AUTO-RTO: 0 /100 WBC (ref 0–0.2)
PLATELET # BLD AUTO: 231 10*3/MM3 (ref 140–450)
PMV BLD AUTO: 10.4 FL (ref 6–12)
RBC # BLD AUTO: 3.84 10*6/MM3 (ref 3.77–5.28)
TIBC SERPL-MCNC: 466 MCG/DL (ref 298–536)
TRANSFERRIN SERPL-MCNC: 313 MG/DL (ref 200–360)
VIT B12 BLD-MCNC: 252 PG/ML (ref 211–946)
WBC NRBC COR # BLD: 4.68 10*3/MM3 (ref 3.4–10.8)

## 2019-11-01 PROCEDURE — 82607 VITAMIN B-12: CPT | Performed by: INTERNAL MEDICINE

## 2019-11-01 PROCEDURE — 84466 ASSAY OF TRANSFERRIN: CPT | Performed by: INTERNAL MEDICINE

## 2019-11-01 PROCEDURE — 85025 COMPLETE CBC W/AUTO DIFF WBC: CPT | Performed by: INTERNAL MEDICINE

## 2019-11-01 PROCEDURE — 99214 OFFICE O/P EST MOD 30 MIN: CPT | Performed by: INTERNAL MEDICINE

## 2019-11-01 PROCEDURE — 82746 ASSAY OF FOLIC ACID SERUM: CPT | Performed by: INTERNAL MEDICINE

## 2019-11-01 PROCEDURE — 83540 ASSAY OF IRON: CPT | Performed by: INTERNAL MEDICINE

## 2019-11-01 PROCEDURE — 82728 ASSAY OF FERRITIN: CPT | Performed by: INTERNAL MEDICINE

## 2019-11-01 PROCEDURE — 36415 COLL VENOUS BLD VENIPUNCTURE: CPT

## 2019-11-01 RX ORDER — OXYCODONE AND ACETAMINOPHEN 7.5; 325 MG/1; MG/1
TABLET ORAL
Refills: 0 | COMMUNITY
Start: 2019-10-10

## 2019-11-01 RX ORDER — INFLUENZA VACCINE, ADJUVANTED 15; 15; 15 UG/.5ML; UG/.5ML; UG/.5ML
INJECTION, SUSPENSION INTRAMUSCULAR
Refills: 0 | COMMUNITY
Start: 2019-09-11 | End: 2020-07-22

## 2019-11-01 RX ORDER — GABAPENTIN 400 MG/1
CAPSULE ORAL
Refills: 5 | COMMUNITY
Start: 2019-10-28

## 2019-11-01 RX ORDER — TRAZODONE HYDROCHLORIDE 100 MG/1
TABLET ORAL
Refills: 3 | COMMUNITY
Start: 2019-10-15

## 2019-11-01 RX ORDER — ARIPIPRAZOLE 15 MG/1
TABLET ORAL
Refills: 3 | COMMUNITY
Start: 2019-10-12 | End: 2020-07-22

## 2019-11-01 NOTE — PROGRESS NOTES
Subjective     REASON FOR FOLLOW UP:    1.  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  2.  Long-term anticoagulation with Eliquis  3.  Morbid obesity.  Patient is wheelchair bound.  4.  History of prior iron deficiency requiring IV iron therapy in the past.  5.  Previous gastric bypass surgery for obesity.    HISTORY OF PRESENT ILLNESS:  The patient is a 68 y.o. year old female who has a history of bilateral lower extremity DVTs and pulmonary embolus several years ago.  She is morbidly obese and very immobile and decision was made that she should remain on anticoagulation lifelong.  She had previously taken Xarelto but currently is on Eliquis 5 mg po BID and seems to be doing well with no further history of recent thrombosis or any significant bleeding issues.  She had previously been followed at a different hematology practice but is transferring her care to our office.    She also has a history of iron deficiency anemia in the setting of previous gastric bypass surgery for obesity.  She has required IV iron therapy in the past. She is wheelchair bound.  Her hemoglobin today is slightly low at 11.6 g/dL.  Her repeat iron studies and ferritin are consistent with further iron deficiency with a ferritin of 15 and an iron saturation 16%.    We will plan to schedule further IV iron therapy in the form of Injectafer 750 mg weekly x2 doses.  History of Present Illness     Past Medical History:   Diagnosis Date   • Allergy    • Anemia    • Arthritis    • Asthma    • Bleeding disorder (CMS/HCC)    • Depression    • Excessive daytime sleepiness    • Fibromyalgia    • H/O DVT (deep venous thrombosis)    • H/O TIA (transient ischemic attack)    • Head trauma    • Migraine    • Panic attacks    • Poor sleep    • Poor vision    • Sleep apnea    • Stroke (CMS/HCC)         Past Surgical History:   Procedure Laterality Date   • GASTRIC BYPASS     • HUMERUS CLOSED REDUCTION Right 09/2016    proximal end  and head        Current Outpatient Medications on File Prior to Visit   Medication Sig Dispense Refill   • albuterol sulfate HFA (VENTOLIN HFA) 108 (90 Base) MCG/ACT inhaler Ventolin HFA 90 mcg/actuation aerosol inhaler     • ARIPiprazole (ABILIFY) 15 MG tablet TK 1 T PO  PO QD  3   • clonazePAM (KlonoPIN) 0.5 MG tablet TK 1 T PO TID  3   • ELIQUIS 5 MG tablet tablet TAKE 1 TABLET BY MOUTH TWICE DAILY 60 tablet 5   • FLUAD 0.5 ML suspension prefilled syringe ADM 0.5ML IM UTD  0   • FLUoxetine (PROzac) 40 MG capsule TK ONE C PO  QAM  2   • fluticasone-salmeterol (WIXELA INHUB) 250-50 MCG/DOSE DISKUS Wixela Inhub 250 mcg-50 mcg/dose powder for inhalation     • gabapentin (NEURONTIN) 400 MG capsule TK 1 C PO BID FOR 30 DAYS UTD  5   • methocarbamol (ROBAXIN) 750 MG tablet methocarbamol 750 mg tablet   TAKE 1 TABLET BY MOUTH FOUR TIMES DAILY AS DIRECTED     • oxyCODONE-acetaminophen (PERCOCET) 7.5-325 MG per tablet TK 1 T PO TID FOR 30 DAYS  0   • pravastatin (PRAVACHOL) 20 MG tablet TK 1 T PO  QHS  1   • temazepam (RESTORIL) 30 MG capsule TK ONE C PO  QHS  5   • traZODone (DESYREL) 100 MG tablet TK 1 T PO  QHS  3   • ARIPiprazole (ABILIFY) 5 MG tablet Take 10 mg by mouth.     • gabapentin (NEURONTIN) 100 MG capsule Take 400 mg by mouth.     • Oxycodone-Acetaminophen (PERCOCET PO) Take  by mouth.     • traZODone (DESYREL) 50 MG tablet 100 mg. TK 1 T PO  QHS  4     No current facility-administered medications on file prior to visit.         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 Negative for activity change, chills and fever.   HENT: Positive for hearing loss. Negative for mouth sores, trouble swallowing and voice change.    Eyes: Negative for pain and visual disturbance.   Respiratory: Positive for cough and shortness of breath. Negative for wheezing.    Cardiovascular: Negative for chest pain and palpitations.   Gastrointestinal: Negative for abdominal pain, constipation, diarrhea, nausea and vomiting.   Endocrine: Positive for cold intolerance.   Genitourinary: Negative for difficulty urinating, frequency and urgency.   Musculoskeletal: Positive for arthralgias, back pain and myalgias. Negative for joint swelling.   Skin: Negative for rash.   Neurological: Positive for headaches. Negative for dizziness, seizures and weakness.   Hematological: Negative for adenopathy. Does not bruise/bleed easily.   Psychiatric/Behavioral: Negative for behavioral problems and confusion. The patient is not nervous/anxious.         Objective     Vitals:    11/01/19 1530   BP: 137/72   Pulse: 59   Resp: 18   Temp: 97.4 °F (36.3 °C)   TempSrc: Oral   SpO2: 93%   Weight: (!) 142 kg (312 lb 6.4 oz)   Height: 160 cm (62.99\")   PainSc:   6   PainLoc: Generalized  Comment: Back and bilat shoulder pain     Current Status 11/1/2019   ECOG score 1       Physical Exam   Constitutional: She is oriented to person, place, and time. She appears well-developed and well-nourished. No distress.   Morbidly obese female seated in a wheelchair in no acute distress.   HENT:   Head: Normocephalic.   Eyes: " Conjunctivae and EOM are normal. Pupils are equal, round, and reactive to light. No scleral icterus.   Neck: Normal range of motion. Neck supple. No JVD present. No thyromegaly present.   Cardiovascular: Normal rate and regular rhythm. Exam reveals no gallop and no friction rub.   No murmur heard.  Pulmonary/Chest: Effort normal. She has no wheezes. She has rales.   Abdominal: Soft. She exhibits no distension and no mass. There is no tenderness.   Musculoskeletal: Normal range of motion. She exhibits no edema or deformity.   Lymphadenopathy:     She has no cervical adenopathy.   Neurological: She is alert and oriented to person, place, and time. She has normal reflexes. No cranial nerve deficit.   Skin: Skin is warm and dry. No rash noted. No erythema.   Psychiatric: Her behavior is normal. Judgment normal.   Flat affect         RECENT LABS:  Hematology WBC   Date Value Ref Range Status   11/01/2019 4.68 3.40 - 10.80 10*3/mm3 Final     RBC   Date Value Ref Range Status   11/01/2019 3.84 3.77 - 5.28 10*6/mm3 Final     Hemoglobin   Date Value Ref Range Status   11/01/2019 11.6 (L) 12.0 - 15.9 g/dL Final     Hematocrit   Date Value Ref Range Status   11/01/2019 37.0 34.0 - 46.6 % Final     Platelets   Date Value Ref Range Status   11/01/2019 231 140 - 450 10*3/mm3 Final        Lab Results   Component Value Date    IITIOVZZ68 252 11/01/2019       Lab Results   Component Value Date    IRON 74 11/01/2019    TIBC 466 11/01/2019    FERRITIN 15.30 11/01/2019   Sat 16%    Assessment/Plan   1.  Lifelong anticoagulation due to prior history of deep vein thromboses and very poor mobility and sedentary lifestyle.  She currently seems to be doing well on 5 mg of  Eliquis twice daily.  2.  Prior history of iron deficiency requiring IV iron therapy in the setting of previous gastric bypass surgery.  She again has developed significant iron deficiency and will require additional IV iron therapy.  3.  Morbid obesity.  Patient is  wheelchair bound.  4.  Mild leukopenia     Recommendations  1.  She will be scheduled for 2 doses of IV Injectafer 750 mg each 1 week apart with Compazine premedication.  3.  Continue anticoagulation long-term with Eliquis 5 mg po BID  4.  We will schedule routine follow-up in our office in 3 months with labs drawn 1 week prior to visit to be sure that she has been fully corrected with the 2 doses of IV Injectafer.

## 2019-11-07 PROBLEM — K91.2 POSTSURGICAL MALABSORPTION: Status: ACTIVE | Noted: 2019-11-07

## 2019-11-07 RX ORDER — SODIUM CHLORIDE 9 MG/ML
250 INJECTION, SOLUTION INTRAVENOUS ONCE
Status: CANCELLED | OUTPATIENT
Start: 2019-11-15

## 2019-11-07 RX ORDER — SODIUM CHLORIDE 9 MG/ML
250 INJECTION, SOLUTION INTRAVENOUS ONCE
Status: CANCELLED | OUTPATIENT
Start: 2019-11-08

## 2019-11-07 RX ORDER — PROCHLORPERAZINE MALEATE 10 MG
10 TABLET ORAL ONCE
Status: CANCELLED
Start: 2019-11-08 | End: 2019-11-08

## 2019-11-07 RX ORDER — PROCHLORPERAZINE MALEATE 10 MG
10 TABLET ORAL ONCE
Status: CANCELLED
Start: 2019-11-15 | End: 2019-11-15

## 2019-11-07 NOTE — PROGRESS NOTES
Supportive plan entered for Injectafer 750 mg IV x 2 doses with Compazine 10 mg PO to be given prior to each dose per scheduling notes Dr. Mosley

## 2019-11-08 ENCOUNTER — INFUSION (OUTPATIENT)
Dept: ONCOLOGY | Facility: HOSPITAL | Age: 69
End: 2019-11-08

## 2019-11-08 VITALS
BODY MASS INDEX: 55.53 KG/M2 | DIASTOLIC BLOOD PRESSURE: 69 MMHG | WEIGHT: 293 LBS | HEART RATE: 64 BPM | TEMPERATURE: 97.6 F | OXYGEN SATURATION: 94 % | RESPIRATION RATE: 16 BRPM | SYSTOLIC BLOOD PRESSURE: 128 MMHG

## 2019-11-08 DIAGNOSIS — D50.8 IRON DEFICIENCY ANEMIA FOLLOWING BARIATRIC SURGERY: ICD-10-CM

## 2019-11-08 DIAGNOSIS — K91.2 POSTSURGICAL MALABSORPTION: Primary | ICD-10-CM

## 2019-11-08 DIAGNOSIS — K95.89 IRON DEFICIENCY ANEMIA FOLLOWING BARIATRIC SURGERY: ICD-10-CM

## 2019-11-08 PROCEDURE — 96374 THER/PROPH/DIAG INJ IV PUSH: CPT | Performed by: INTERNAL MEDICINE

## 2019-11-08 PROCEDURE — 25010000002 FERRIC CARBOXYMALTOSE 750 MG/15ML SOLUTION 15 ML VIAL: Performed by: INTERNAL MEDICINE

## 2019-11-08 PROCEDURE — 63710000001 PROCHLORPERAZINE MALEATE PER 5 MG: Performed by: INTERNAL MEDICINE

## 2019-11-08 RX ORDER — PROCHLORPERAZINE MALEATE 5 MG/1
10 TABLET ORAL ONCE
Status: COMPLETED | OUTPATIENT
Start: 2019-11-08 | End: 2019-11-08

## 2019-11-08 RX ORDER — SODIUM CHLORIDE 9 MG/ML
250 INJECTION, SOLUTION INTRAVENOUS ONCE
Status: COMPLETED | OUTPATIENT
Start: 2019-11-08 | End: 2019-11-08

## 2019-11-08 RX ADMIN — SODIUM CHLORIDE 250 ML: 900 INJECTION, SOLUTION INTRAVENOUS at 15:33

## 2019-11-08 RX ADMIN — PROCHLORPERAZINE MALEATE 10 MG: 5 TABLET ORAL at 15:31

## 2019-11-08 RX ADMIN — FERRIC CARBOXYMALTOSE INJECTION 750 MG: 50 INJECTION, SOLUTION INTRAVENOUS at 15:51

## 2019-11-15 ENCOUNTER — INFUSION (OUTPATIENT)
Dept: ONCOLOGY | Facility: HOSPITAL | Age: 69
End: 2019-11-15

## 2019-11-15 VITALS
BODY MASS INDEX: 51.91 KG/M2 | WEIGHT: 293 LBS | RESPIRATION RATE: 20 BRPM | TEMPERATURE: 97.8 F | OXYGEN SATURATION: 92 % | HEIGHT: 63 IN | DIASTOLIC BLOOD PRESSURE: 68 MMHG | SYSTOLIC BLOOD PRESSURE: 120 MMHG | HEART RATE: 65 BPM

## 2019-11-15 DIAGNOSIS — D50.8 IRON DEFICIENCY ANEMIA FOLLOWING BARIATRIC SURGERY: ICD-10-CM

## 2019-11-15 DIAGNOSIS — K95.89 IRON DEFICIENCY ANEMIA FOLLOWING BARIATRIC SURGERY: ICD-10-CM

## 2019-11-15 DIAGNOSIS — K91.2 POSTSURGICAL MALABSORPTION: Primary | ICD-10-CM

## 2019-11-15 PROCEDURE — 63710000001 PROCHLORPERAZINE MALEATE PER 5 MG: Performed by: INTERNAL MEDICINE

## 2019-11-15 PROCEDURE — 25010000002 FERRIC CARBOXYMALTOSE 750 MG/15ML SOLUTION 15 ML VIAL: Performed by: INTERNAL MEDICINE

## 2019-11-15 PROCEDURE — 96374 THER/PROPH/DIAG INJ IV PUSH: CPT | Performed by: INTERNAL MEDICINE

## 2019-11-15 RX ORDER — SODIUM CHLORIDE 9 MG/ML
250 INJECTION, SOLUTION INTRAVENOUS ONCE
Status: COMPLETED | OUTPATIENT
Start: 2019-11-15 | End: 2019-11-15

## 2019-11-15 RX ORDER — PROCHLORPERAZINE MALEATE 5 MG/1
10 TABLET ORAL ONCE
Status: COMPLETED | OUTPATIENT
Start: 2019-11-15 | End: 2019-11-15

## 2019-11-15 RX ADMIN — SODIUM CHLORIDE 250 ML: 900 INJECTION, SOLUTION INTRAVENOUS at 14:24

## 2019-11-15 RX ADMIN — PROCHLORPERAZINE MALEATE 10 MG: 5 TABLET ORAL at 14:23

## 2019-11-15 RX ADMIN — FERRIC CARBOXYMALTOSE INJECTION 750 MG: 50 INJECTION, SOLUTION INTRAVENOUS at 14:26

## 2019-12-10 RX ORDER — APIXABAN 5 MG/1
TABLET, FILM COATED ORAL
Qty: 60 TABLET | Refills: 6 | Status: SHIPPED | OUTPATIENT
Start: 2019-12-10 | End: 2020-07-20

## 2020-02-11 ENCOUNTER — LAB (OUTPATIENT)
Dept: OTHER | Facility: HOSPITAL | Age: 70
End: 2020-02-11

## 2020-02-11 DIAGNOSIS — K95.89 IRON DEFICIENCY ANEMIA FOLLOWING BARIATRIC SURGERY: ICD-10-CM

## 2020-02-11 DIAGNOSIS — D50.8 IRON DEFICIENCY ANEMIA FOLLOWING BARIATRIC SURGERY: ICD-10-CM

## 2020-02-11 LAB
BASOPHILS # BLD AUTO: 0.02 10*3/MM3 (ref 0–0.2)
BASOPHILS NFR BLD AUTO: 0.4 % (ref 0–1.5)
DEPRECATED RDW RBC AUTO: 47 FL (ref 37–54)
EOSINOPHIL # BLD AUTO: 0.11 10*3/MM3 (ref 0–0.4)
EOSINOPHIL NFR BLD AUTO: 2.4 % (ref 0.3–6.2)
ERYTHROCYTE [DISTWIDTH] IN BLOOD BY AUTOMATED COUNT: 12.9 % (ref 12.3–15.4)
FERRITIN SERPL-MCNC: 320.4 NG/ML (ref 13–150)
HCT VFR BLD AUTO: 40 % (ref 34–46.6)
HGB BLD-MCNC: 12.4 G/DL (ref 12–15.9)
IMM GRANULOCYTES # BLD AUTO: 0.04 10*3/MM3 (ref 0–0.05)
IMM GRANULOCYTES NFR BLD AUTO: 0.9 % (ref 0–0.5)
IRON 24H UR-MRATE: 73 MCG/DL (ref 37–145)
IRON SATN MFR SERPL: 23 % (ref 20–50)
LYMPHOCYTES # BLD AUTO: 1.06 10*3/MM3 (ref 0.7–3.1)
LYMPHOCYTES NFR BLD AUTO: 22.7 % (ref 19.6–45.3)
MCH RBC QN AUTO: 30.8 PG (ref 26.6–33)
MCHC RBC AUTO-ENTMCNC: 31 G/DL (ref 31.5–35.7)
MCV RBC AUTO: 99.3 FL (ref 79–97)
MONOCYTES # BLD AUTO: 0.47 10*3/MM3 (ref 0.1–0.9)
MONOCYTES NFR BLD AUTO: 10.1 % (ref 5–12)
NEUTROPHILS # BLD AUTO: 2.97 10*3/MM3 (ref 1.7–7)
NEUTROPHILS NFR BLD AUTO: 63.5 % (ref 42.7–76)
NRBC BLD AUTO-RTO: 0 /100 WBC (ref 0–0.2)
PLATELET # BLD AUTO: 228 10*3/MM3 (ref 140–450)
PMV BLD AUTO: 10.2 FL (ref 6–12)
RBC # BLD AUTO: 4.03 10*6/MM3 (ref 3.77–5.28)
TIBC SERPL-MCNC: 319 MCG/DL (ref 298–536)
TRANSFERRIN SERPL-MCNC: 214 MG/DL (ref 200–360)
VIT B12 BLD-MCNC: 280 PG/ML (ref 211–946)
WBC NRBC COR # BLD: 4.67 10*3/MM3 (ref 3.4–10.8)

## 2020-02-11 PROCEDURE — 36415 COLL VENOUS BLD VENIPUNCTURE: CPT

## 2020-02-11 PROCEDURE — 82607 VITAMIN B-12: CPT | Performed by: INTERNAL MEDICINE

## 2020-02-11 PROCEDURE — 82728 ASSAY OF FERRITIN: CPT | Performed by: INTERNAL MEDICINE

## 2020-02-11 PROCEDURE — 83540 ASSAY OF IRON: CPT | Performed by: INTERNAL MEDICINE

## 2020-02-11 PROCEDURE — 84466 ASSAY OF TRANSFERRIN: CPT | Performed by: INTERNAL MEDICINE

## 2020-02-11 PROCEDURE — 85025 COMPLETE CBC W/AUTO DIFF WBC: CPT | Performed by: INTERNAL MEDICINE

## 2020-02-18 ENCOUNTER — APPOINTMENT (OUTPATIENT)
Dept: OTHER | Facility: HOSPITAL | Age: 70
End: 2020-02-18

## 2020-03-13 ENCOUNTER — APPOINTMENT (OUTPATIENT)
Dept: OTHER | Facility: HOSPITAL | Age: 70
End: 2020-03-13

## 2020-05-13 ENCOUNTER — TELEPHONE (OUTPATIENT)
Dept: ONCOLOGY | Facility: HOSPITAL | Age: 70
End: 2020-05-13

## 2020-05-13 ENCOUNTER — TELEPHONE (OUTPATIENT)
Dept: ONCOLOGY | Facility: CLINIC | Age: 70
End: 2020-05-13

## 2020-05-13 NOTE — TELEPHONE ENCOUNTER
Patient was wanting to know how long she can be off blood thinners before doing her dental procedure. Her callback is [phone]

## 2020-05-13 NOTE — TELEPHONE ENCOUNTER
Pt notified, v/u.       ----- Message from Sunny Mosley MD sent at 5/13/2020  9:45 AM EDT -----  Stop 2 days before and resume 2 days after    Thanks  ----- Message -----  From: Yareli Lawrence RN  Sent: 5/13/2020   9:30 AM EDT  To: MD Dr. Dimitri Sparks, pt is going to have several teeth extracted.  Said the root is rotten all the way up into the cheek bones so it is going to be extensive.  She is on eliquis and needs to know how many days to hold before and after.  Let me know, thanks!

## 2020-05-13 NOTE — TELEPHONE ENCOUNTER
Dr. Mosley, pt is going to have several teeth extracted.  Said the root is rotten all the way up into the cheek bones so it is going to be extensive.  She is on eliquis and needs to know how many days to hold before and after.  Let me know, thanks!              Patient was wanting to know how long she can be off blood thinners before doing her dental procedure. Her callback is [phone]

## 2020-07-20 RX ORDER — APIXABAN 5 MG/1
TABLET, FILM COATED ORAL
Qty: 60 TABLET | Refills: 0 | Status: SHIPPED | OUTPATIENT
Start: 2020-07-20 | End: 2020-08-17

## 2020-07-22 ENCOUNTER — OFFICE VISIT (OUTPATIENT)
Dept: ONCOLOGY | Facility: CLINIC | Age: 70
End: 2020-07-22

## 2020-07-22 ENCOUNTER — LAB (OUTPATIENT)
Dept: LAB | Facility: HOSPITAL | Age: 70
End: 2020-07-22

## 2020-07-22 VITALS
OXYGEN SATURATION: 91 % | WEIGHT: 291.2 LBS | DIASTOLIC BLOOD PRESSURE: 76 MMHG | BODY MASS INDEX: 51.6 KG/M2 | HEART RATE: 62 BPM | TEMPERATURE: 97.1 F | SYSTOLIC BLOOD PRESSURE: 121 MMHG | HEIGHT: 63 IN | RESPIRATION RATE: 16 BRPM

## 2020-07-22 DIAGNOSIS — F33.1 MODERATE EPISODE OF RECURRENT MAJOR DEPRESSIVE DISORDER (HCC): ICD-10-CM

## 2020-07-22 DIAGNOSIS — D50.8 IRON DEFICIENCY ANEMIA FOLLOWING BARIATRIC SURGERY: Primary | ICD-10-CM

## 2020-07-22 DIAGNOSIS — Z86.718 HISTORY OF DVT IN ADULTHOOD: ICD-10-CM

## 2020-07-22 DIAGNOSIS — K95.89 IRON DEFICIENCY ANEMIA FOLLOWING BARIATRIC SURGERY: Primary | ICD-10-CM

## 2020-07-22 DIAGNOSIS — E53.8 B12 DEFICIENCY: ICD-10-CM

## 2020-07-22 DIAGNOSIS — K91.2 POSTSURGICAL MALABSORPTION: ICD-10-CM

## 2020-07-22 LAB
ALBUMIN SERPL-MCNC: 3.9 G/DL (ref 3.5–5.2)
ALBUMIN/GLOB SERPL: 1.7 G/DL (ref 1.1–2.4)
ALP SERPL-CCNC: 74 U/L (ref 38–116)
ALT SERPL W P-5'-P-CCNC: 28 U/L (ref 0–33)
ANION GAP SERPL CALCULATED.3IONS-SCNC: 12.7 MMOL/L (ref 5–15)
AST SERPL-CCNC: 30 U/L (ref 0–32)
BASOPHILS # BLD AUTO: 0.02 10*3/MM3 (ref 0–0.2)
BASOPHILS NFR BLD AUTO: 0.4 % (ref 0–1.5)
BILIRUB SERPL-MCNC: 0.4 MG/DL (ref 0.2–1.2)
BUN SERPL-MCNC: 14 MG/DL (ref 6–20)
BUN/CREAT SERPL: 14 (ref 7.3–30)
CALCIUM SPEC-SCNC: 9.5 MG/DL (ref 8.5–10.2)
CHLORIDE SERPL-SCNC: 97 MMOL/L (ref 98–107)
CO2 SERPL-SCNC: 23.3 MMOL/L (ref 22–29)
CREAT SERPL-MCNC: 1 MG/DL (ref 0.6–1.1)
DEPRECATED RDW RBC AUTO: 50.2 FL (ref 37–54)
EOSINOPHIL # BLD AUTO: 0.06 10*3/MM3 (ref 0–0.4)
EOSINOPHIL NFR BLD AUTO: 1.2 % (ref 0.3–6.2)
ERYTHROCYTE [DISTWIDTH] IN BLOOD BY AUTOMATED COUNT: 14.6 % (ref 12.3–15.4)
FERRITIN SERPL-MCNC: 331.3 NG/ML (ref 13–150)
GFR SERPL CREATININE-BSD FRML MDRD: 55 ML/MIN/1.73
GLOBULIN UR ELPH-MCNC: 2.3 GM/DL (ref 1.8–3.5)
GLUCOSE SERPL-MCNC: 97 MG/DL (ref 74–124)
HCT VFR BLD AUTO: 38.2 % (ref 34–46.6)
HGB BLD-MCNC: 12.3 G/DL (ref 12–15.9)
IMM GRANULOCYTES # BLD AUTO: 0.06 10*3/MM3 (ref 0–0.05)
IMM GRANULOCYTES NFR BLD AUTO: 1.2 % (ref 0–0.5)
IRON 24H UR-MRATE: 80 MCG/DL (ref 37–145)
IRON SATN MFR SERPL: 30 % (ref 14–48)
LYMPHOCYTES # BLD AUTO: 1.08 10*3/MM3 (ref 0.7–3.1)
LYMPHOCYTES NFR BLD AUTO: 21 % (ref 19.6–45.3)
MCH RBC QN AUTO: 30.2 PG (ref 26.6–33)
MCHC RBC AUTO-ENTMCNC: 32.2 G/DL (ref 31.5–35.7)
MCV RBC AUTO: 93.9 FL (ref 79–97)
MONOCYTES # BLD AUTO: 0.44 10*3/MM3 (ref 0.1–0.9)
MONOCYTES NFR BLD AUTO: 8.5 % (ref 5–12)
NEUTROPHILS NFR BLD AUTO: 3.49 10*3/MM3 (ref 1.7–7)
NEUTROPHILS NFR BLD AUTO: 67.7 % (ref 42.7–76)
NRBC BLD AUTO-RTO: 0 /100 WBC (ref 0–0.2)
PLATELET # BLD AUTO: 254 10*3/MM3 (ref 140–450)
PMV BLD AUTO: 11.1 FL (ref 6–12)
POTASSIUM SERPL-SCNC: 4.7 MMOL/L (ref 3.5–4.7)
PROT SERPL-MCNC: 6.2 G/DL (ref 6.3–8)
RBC # BLD AUTO: 4.07 10*6/MM3 (ref 3.77–5.28)
SODIUM SERPL-SCNC: 133 MMOL/L (ref 134–145)
TIBC SERPL-MCNC: 265 MCG/DL (ref 249–505)
TRANSFERRIN SERPL-MCNC: 189 MG/DL (ref 200–360)
VIT B12 BLD-MCNC: 567 PG/ML (ref 211–946)
WBC # BLD AUTO: 5.15 10*3/MM3 (ref 3.4–10.8)

## 2020-07-22 PROCEDURE — 80053 COMPREHEN METABOLIC PANEL: CPT

## 2020-07-22 PROCEDURE — 36415 COLL VENOUS BLD VENIPUNCTURE: CPT

## 2020-07-22 PROCEDURE — 84466 ASSAY OF TRANSFERRIN: CPT | Performed by: INTERNAL MEDICINE

## 2020-07-22 PROCEDURE — 82728 ASSAY OF FERRITIN: CPT | Performed by: INTERNAL MEDICINE

## 2020-07-22 PROCEDURE — 99214 OFFICE O/P EST MOD 30 MIN: CPT | Performed by: INTERNAL MEDICINE

## 2020-07-22 PROCEDURE — 82607 VITAMIN B-12: CPT | Performed by: INTERNAL MEDICINE

## 2020-07-22 PROCEDURE — 83540 ASSAY OF IRON: CPT | Performed by: INTERNAL MEDICINE

## 2020-07-22 PROCEDURE — 85025 COMPLETE CBC W/AUTO DIFF WBC: CPT

## 2020-07-22 RX ORDER — ARIPIPRAZOLE 5 MG/1
TABLET ORAL
COMMUNITY
End: 2021-02-23

## 2020-07-22 RX ORDER — VENLAFAXINE HYDROCHLORIDE 150 MG/1
CAPSULE, EXTENDED RELEASE ORAL
COMMUNITY
End: 2021-11-03

## 2020-07-22 RX ORDER — NITROFURANTOIN MACROCRYSTALS 100 MG/1
CAPSULE ORAL
COMMUNITY
End: 2021-02-23

## 2020-07-22 RX ORDER — DOCUSATE SODIUM 100 MG/1
CAPSULE, LIQUID FILLED ORAL
COMMUNITY

## 2020-07-22 RX ORDER — ALBUTEROL SULFATE 2.5 MG/3ML
SOLUTION RESPIRATORY (INHALATION)
COMMUNITY

## 2020-07-22 RX ORDER — CHLORHEXIDINE GLUCONATE 0.12 MG/ML
RINSE ORAL
COMMUNITY
End: 2021-02-23

## 2020-07-22 NOTE — PROGRESS NOTES
Subjective     REASON FOR FOLLOW UP:    1.  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  2.  Long-term anticoagulation with Eliquis  3.  Morbid obesity.  Patient is wheelchair bound.  4.  History of prior iron deficiency requiring IV iron therapy in the past.  5.  Previous gastric bypass surgery for obesity.    HISTORY OF PRESENT ILLNESS:  The patient is a 69 y.o. year old female who has a history of bilateral lower extremity DVTs and pulmonary embolus several years ago.  She is morbidly obese and very immobile and decision was made that she should remain on anticoagulation lifelong.  She had previously taken Xarelto but currently is on Eliquis 5 mg po BID and seems to be doing well with no further history of recent thrombosis or any significant bleeding issues.  She had previously been followed at a different hematology practice but is transferring her care to our office.    She also has a history of iron deficiency anemia in the setting of previous gastric bypass surgery for obesity.  She has required IV iron therapy in the past. She is wheelchair bound.     Since her last office visit, she received additional IV iron therapy in the form of Injectafer 750 mg weekly x2 doses on 11/8/2019 and 11/15/2019.     History of Present Illness     Past Medical History:   Diagnosis Date   • Allergy    • Anemia    • Arthritis    • Asthma    • Bleeding disorder (CMS/HCC)    • Depression    • Excessive daytime sleepiness    • Fibromyalgia    • H/O DVT (deep venous thrombosis)    • H/O TIA (transient ischemic attack)    • Head trauma    • Migraine    • Panic attacks    • Poor sleep    • Poor vision    • Sleep apnea    • Stroke (CMS/HCC)         Past Surgical History:   Procedure Laterality Date   • GASTRIC BYPASS     • HUMERUS CLOSED REDUCTION Right 09/2016    proximal end and head        Current Outpatient Medications on File Prior to Visit   Medication Sig Dispense Refill   • albuterol sulfate HFA  (VENTOLIN HFA) 108 (90 Base) MCG/ACT inhaler Ventolin HFA 90 mcg/actuation aerosol inhaler     • ARIPiprazole (ABILIFY) 15 MG tablet TK 1 T PO  PO QD  3   • clonazePAM (KlonoPIN) 0.5 MG tablet TK 1 T PO TID  3   • ELIQUIS 5 MG tablet tablet TAKE 1 TABLET BY MOUTH TWICE DAILY 60 tablet 0   • FLUAD 0.5 ML suspension prefilled syringe ADM 0.5ML IM UTD  0   • FLUoxetine (PROzac) 40 MG capsule TK ONE C PO  QAM  2   • fluticasone-salmeterol (WIXELA INHUB) 250-50 MCG/DOSE DISKUS Wixela Inhub 250 mcg-50 mcg/dose powder for inhalation     • gabapentin (NEURONTIN) 400 MG capsule TK 1 C PO BID FOR 30 DAYS UTD  5   • hepatitis A (Havrix) 1440 EL U/ML vaccine Havrix (PF) 1,440 HENRY unit/mL intramuscular syringe     • influenza vac split high-dose (FLUZONE HIGH DOSE) 0.5 ML suspension prefilled syringe injection Fluzone High-Dose 4356-7595 (PF) 180 mcg/0.5 mL intramuscular syringe     • methocarbamol (ROBAXIN) 750 MG tablet methocarbamol 750 mg tablet   TAKE 1 TABLET BY MOUTH FOUR TIMES DAILY AS DIRECTED     • oxyCODONE-acetaminophen (PERCOCET) 7.5-325 MG per tablet TK 1 T PO TID FOR 30 DAYS  0   • pravastatin (PRAVACHOL) 20 MG tablet TK 1 T PO  QHS  1   • temazepam (RESTORIL) 30 MG capsule TK ONE C PO  QHS  5   • traZODone (DESYREL) 100 MG tablet TK 1 T PO  QHS  3   • traZODone (DESYREL) 50 MG tablet 100 mg. TK 1 T PO  QHS  4     No current facility-administered medications on file prior to visit.         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 Negative for activity change, chills and fever.   HENT: Positive for hearing loss. Negative for mouth sores, trouble swallowing and voice change.    Eyes: Negative for pain and visual disturbance.   Respiratory: Positive for cough and shortness of breath. Negative for wheezing.    Cardiovascular: Negative for chest pain and palpitations.   Gastrointestinal: Negative for abdominal pain, constipation, diarrhea, nausea and vomiting.   Endocrine: Positive for cold intolerance.   Genitourinary: Negative for difficulty urinating, frequency and urgency.   Musculoskeletal: Positive for arthralgias, back pain and myalgias. Negative for joint swelling.   Skin: Negative for rash.   Neurological: Positive for headaches. Negative for dizziness, seizures and weakness.   Hematological: Negative for adenopathy. Does not bruise/bleed easily.   Psychiatric/Behavioral: Positive for dysphoric mood and suicidal ideas. Negative for behavioral problems and confusion. The patient is not nervous/anxious.         Administer PHQ and document follow-up plan if positive   Total Score: 17     Patient screened positive for depression based on a PHQ-9 score of 17 on 7/22/2020. Follow-up recommendations include: Patient is already seeing a mental health specialist and is undergoing treatment for depression..      Objective     Vitals:    07/22/20 1053   BP: 121/76   Pulse: 62   Resp: 16   Temp: 97.1 °F (36.2 °C)   TempSrc: Skin   SpO2: 91%   Weight: 132 kg (291 lb 3.2 oz)   Height: 160 cm (62.99\")   PainSc:   7   PainLoc: Back  Comment: lower back     Current " Status 7/22/2020   ECOG score 1       Physical Exam   Constitutional: She is oriented to person, place, and time. She appears well-developed and well-nourished. No distress.   Morbidly obese female seated in a wheelchair in no acute distress.   HENT:   Head: Normocephalic.   Eyes: Pupils are equal, round, and reactive to light. Conjunctivae and EOM are normal. No scleral icterus.   Neck: Normal range of motion. Neck supple. No JVD present. No thyromegaly present.   Cardiovascular: Normal rate and regular rhythm. Exam reveals no gallop and no friction rub.   No murmur heard.  Pulmonary/Chest: Effort normal. She has no wheezes. She has rales.   Abdominal: Soft. She exhibits no distension and no mass. There is no tenderness.   Musculoskeletal: Normal range of motion. She exhibits no edema or deformity.   Lymphadenopathy:     She has no cervical adenopathy.   Neurological: She is alert and oriented to person, place, and time. She has normal reflexes. No cranial nerve deficit.   Skin: Skin is warm and dry. No rash noted. No erythema.   Psychiatric: Her behavior is normal. Judgment normal.   Flat affect   Repeated and unchanged on the visit of 7/22/2020    RECENT LABS:  Hematology WBC   Date Value Ref Range Status   07/22/2020 5.15 3.40 - 10.80 10*3/mm3 Final     RBC   Date Value Ref Range Status   07/22/2020 4.07 3.77 - 5.28 10*6/mm3 Final     Hemoglobin   Date Value Ref Range Status   07/22/2020 12.3 12.0 - 15.9 g/dL Final     Hematocrit   Date Value Ref Range Status   07/22/2020 38.2 34.0 - 46.6 % Final     Platelets   Date Value Ref Range Status   07/22/2020 254 140 - 450 10*3/mm3 Final        Lab Results   Component Value Date    [id number] 280 02/11/2020       Lab Results   Component Value Date    IRON 73 02/11/2020    TIBC 319 02/11/2020    FERRITIN 320.40 (H) 02/11/2020       Assessment/Plan   1.  Lifelong anticoagulation due to prior history of deep vein thromboses and very poor mobility and sedentary lifestyle.   She currently seems to be doing well on 5 mg of  Eliquis twice daily.  2.  Prior history of iron deficiency requiring IV iron therapy in the setting of previous gastric bypass surgery.  She most recently received 2 doses of IV Injectafer in November 2019.  3.  Morbid obesity.  Patient is wheelchair bound.  4.  Mild leukopenia     Plan  1.  Continue anticoagulation long-term with Eliquis 5 mg po BID  2.  We will schedule routine follow-up in our office in 6 months with labs drawn 1 week prior to visit .  3.  Because her last B12 level was borderline low and she is not on any B12 replacement currently we prescribed 1000 mcg B12 lozenge to be taken daily.  #90 with 3 refills

## 2020-08-17 RX ORDER — APIXABAN 5 MG/1
TABLET, FILM COATED ORAL
Qty: 180 TABLET | Refills: 1 | Status: SHIPPED | OUTPATIENT
Start: 2020-08-17 | End: 2022-01-25 | Stop reason: SDUPTHER

## 2021-01-14 ENCOUNTER — LAB (OUTPATIENT)
Dept: LAB | Facility: HOSPITAL | Age: 71
End: 2021-01-14

## 2021-01-14 DIAGNOSIS — E53.8 B12 DEFICIENCY: ICD-10-CM

## 2021-01-14 DIAGNOSIS — Z86.718 HISTORY OF DVT IN ADULTHOOD: ICD-10-CM

## 2021-01-14 DIAGNOSIS — K95.89 IRON DEFICIENCY ANEMIA FOLLOWING BARIATRIC SURGERY: ICD-10-CM

## 2021-01-14 DIAGNOSIS — D50.8 IRON DEFICIENCY ANEMIA FOLLOWING BARIATRIC SURGERY: ICD-10-CM

## 2021-01-14 DIAGNOSIS — K91.2 POSTSURGICAL MALABSORPTION: ICD-10-CM

## 2021-01-14 LAB
BASOPHILS # BLD AUTO: 0.02 10*3/MM3 (ref 0–0.2)
BASOPHILS NFR BLD AUTO: 0.3 % (ref 0–1.5)
DEPRECATED RDW RBC AUTO: 45.9 FL (ref 37–54)
EOSINOPHIL # BLD AUTO: 0.04 10*3/MM3 (ref 0–0.4)
EOSINOPHIL NFR BLD AUTO: 0.6 % (ref 0.3–6.2)
ERYTHROCYTE [DISTWIDTH] IN BLOOD BY AUTOMATED COUNT: 13.5 % (ref 12.3–15.4)
FERRITIN SERPL-MCNC: 279.8 NG/ML (ref 13–150)
HCT VFR BLD AUTO: 38.6 % (ref 34–46.6)
HGB BLD-MCNC: 12.1 G/DL (ref 12–15.9)
IMM GRANULOCYTES # BLD AUTO: 0.04 10*3/MM3 (ref 0–0.05)
IMM GRANULOCYTES NFR BLD AUTO: 0.6 % (ref 0–0.5)
IRON 24H UR-MRATE: 64 MCG/DL (ref 37–145)
IRON SATN MFR SERPL: 22 % (ref 14–48)
LYMPHOCYTES # BLD AUTO: 1.03 10*3/MM3 (ref 0.7–3.1)
LYMPHOCYTES NFR BLD AUTO: 15.1 % (ref 19.6–45.3)
MCH RBC QN AUTO: 29.4 PG (ref 26.6–33)
MCHC RBC AUTO-ENTMCNC: 31.3 G/DL (ref 31.5–35.7)
MCV RBC AUTO: 93.7 FL (ref 79–97)
MONOCYTES # BLD AUTO: 0.38 10*3/MM3 (ref 0.1–0.9)
MONOCYTES NFR BLD AUTO: 5.6 % (ref 5–12)
NEUTROPHILS NFR BLD AUTO: 5.32 10*3/MM3 (ref 1.7–7)
NEUTROPHILS NFR BLD AUTO: 77.8 % (ref 42.7–76)
NRBC BLD AUTO-RTO: 0 /100 WBC (ref 0–0.2)
PLATELET # BLD AUTO: 275 10*3/MM3 (ref 140–450)
PMV BLD AUTO: 10.5 FL (ref 6–12)
RBC # BLD AUTO: 4.12 10*6/MM3 (ref 3.77–5.28)
TIBC SERPL-MCNC: 286 MCG/DL (ref 249–505)
TRANSFERRIN SERPL-MCNC: 204 MG/DL (ref 200–360)
VIT B12 BLD-MCNC: 1428 PG/ML (ref 211–946)
WBC # BLD AUTO: 6.83 10*3/MM3 (ref 3.4–10.8)

## 2021-01-14 PROCEDURE — 36415 COLL VENOUS BLD VENIPUNCTURE: CPT

## 2021-01-14 PROCEDURE — 84466 ASSAY OF TRANSFERRIN: CPT

## 2021-01-14 PROCEDURE — 83540 ASSAY OF IRON: CPT

## 2021-01-14 PROCEDURE — 85025 COMPLETE CBC W/AUTO DIFF WBC: CPT

## 2021-01-14 PROCEDURE — 82607 VITAMIN B-12: CPT | Performed by: INTERNAL MEDICINE

## 2021-01-14 PROCEDURE — 82728 ASSAY OF FERRITIN: CPT

## 2021-01-21 ENCOUNTER — APPOINTMENT (OUTPATIENT)
Dept: LAB | Facility: HOSPITAL | Age: 71
End: 2021-01-21

## 2021-01-21 ENCOUNTER — OFFICE VISIT (OUTPATIENT)
Dept: ONCOLOGY | Facility: CLINIC | Age: 71
End: 2021-01-21

## 2021-01-21 VITALS
TEMPERATURE: 97.8 F | SYSTOLIC BLOOD PRESSURE: 120 MMHG | HEART RATE: 73 BPM | OXYGEN SATURATION: 90 % | WEIGHT: 256.7 LBS | HEIGHT: 63 IN | DIASTOLIC BLOOD PRESSURE: 72 MMHG | BODY MASS INDEX: 45.48 KG/M2 | RESPIRATION RATE: 16 BRPM

## 2021-01-21 DIAGNOSIS — K91.2 POSTSURGICAL MALABSORPTION: Primary | ICD-10-CM

## 2021-01-21 DIAGNOSIS — D50.8 IRON DEFICIENCY ANEMIA FOLLOWING BARIATRIC SURGERY: ICD-10-CM

## 2021-01-21 DIAGNOSIS — K95.89 IRON DEFICIENCY ANEMIA FOLLOWING BARIATRIC SURGERY: ICD-10-CM

## 2021-01-21 DIAGNOSIS — Z86.718 HISTORY OF DVT IN ADULTHOOD: ICD-10-CM

## 2021-01-21 DIAGNOSIS — E53.8 B12 DEFICIENCY: ICD-10-CM

## 2021-01-21 PROCEDURE — 99213 OFFICE O/P EST LOW 20 MIN: CPT | Performed by: INTERNAL MEDICINE

## 2021-01-21 RX ORDER — OXYBUTYNIN CHLORIDE 10 MG/1
10 TABLET, EXTENDED RELEASE ORAL DAILY
COMMUNITY
Start: 2020-10-27 | End: 2021-11-03

## 2021-01-21 RX ORDER — ARIPIPRAZOLE 15 MG/1
TABLET ORAL
COMMUNITY
Start: 2021-01-12

## 2021-01-21 RX ORDER — CIPROFLOXACIN 500 MG/1
TABLET, FILM COATED ORAL
COMMUNITY
Start: 2021-01-18 | End: 2021-02-23

## 2021-01-21 RX ORDER — PANTOPRAZOLE SODIUM 20 MG/1
TABLET, DELAYED RELEASE ORAL
COMMUNITY
Start: 2020-11-18 | End: 2021-11-03

## 2021-01-21 RX ORDER — INFLUENZA A VIRUS A/MICHIGAN/45/2015 X-275 (H1N1) ANTIGEN (FORMALDEHYDE INACTIVATED), INFLUENZA A VIRUS A/SINGAPORE/INFIMH-16-0019/2016 IVR-186 (H3N2) ANTIGEN (FORMALDEHYDE INACTIVATED), INFLUENZA B VIRUS B/PHUKET/3073/2013 ANTIGEN (FORMALDEHYDE INACTIVATED), AND INFLUENZA B VIRUS B/MARYLAND/15/2016 BX-69A ANTIGEN (FORMALDEHYDE INACTIVATED) 60; 60; 60; 60 UG/.7ML; UG/.7ML; UG/.7ML; UG/.7ML
INJECTION, SUSPENSION INTRAMUSCULAR
COMMUNITY
Start: 2020-11-05

## 2021-01-21 RX ORDER — NITROFURANTOIN 25; 75 MG/1; MG/1
CAPSULE ORAL
COMMUNITY
Start: 2020-12-31 | End: 2021-02-23

## 2021-01-21 RX ORDER — ESZOPICLONE 3 MG/1
3 TABLET, FILM COATED ORAL DAILY
COMMUNITY
End: 2021-02-23

## 2021-01-21 NOTE — PROGRESS NOTES
Subjective     REASON FOR FOLLOW UP:    1.  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  2.  Long-term anticoagulation with Eliquis  3.  Morbid obesity.  Patient is wheelchair bound.  4.  History of prior iron deficiency requiring IV iron therapy in the past.  5.  Previous gastric bypass surgery for obesity.    HISTORY OF PRESENT ILLNESS:  The patient is a 70 y.o. year old female who has a history of bilateral lower extremity DVTs and pulmonary embolus several years ago.  She is morbidly obese and very immobile and decision was made that she should remain on anticoagulation lifelong.  She had previously taken Xarelto but currently is on Eliquis 5 mg po BID and seems to be doing well with no further history of recent thrombosis or any significant bleeding issues.  She had previously been followed at a different hematology practice but is transferring her care to our office.    She also has a history of iron deficiency anemia in the setting of previous gastric bypass surgery for obesity.  She has required IV iron therapy in the past. She is wheelchair bound.     She last IV iron therapy consisted of Injectafer 750 mg weekly x2 doses on 11/8/2019 and 11/15/2019.    She is here today for routine 6-month follow-up and had labs performed last week on 1/14/2021 that showed she is replete with iron with an iron saturation of 22% and a ferritin of 279 with a hemoglobin of 12.1.  She also has been taking oral B12 daily and her B12 level was greater than 1400.     History of Present Illness     Past Medical History:   Diagnosis Date   • Allergy    • Anemia    • Arthritis    • Asthma    • Bleeding disorder (CMS/HCC)    • Depression    • Excessive daytime sleepiness    • Fibromyalgia    • H/O DVT (deep venous thrombosis)    • H/O TIA (transient ischemic attack)    • Head trauma    • Migraine    • Panic attacks    • Poor sleep    • Poor vision    • Sleep apnea    • Stroke (CMS/HCC)         Past Surgical  History:   Procedure Laterality Date   • GASTRIC BYPASS     • HUMERUS CLOSED REDUCTION Right 09/2016    proximal end and head        Current Outpatient Medications on File Prior to Visit   Medication Sig Dispense Refill   • albuterol (PROVENTIL) (2.5 MG/3ML) 0.083% nebulizer solution albuterol sulfate 2.5 mg/3 mL (0.083 %) solution for nebulization     • albuterol sulfate HFA (VENTOLIN HFA) 108 (90 Base) MCG/ACT inhaler Ventolin HFA 90 mcg/actuation aerosol inhaler     • ARIPiprazole (ABILIFY) 15 MG tablet      • ARIPiprazole (ABILIFY) 5 MG tablet aripiprazole 5 mg tablet     • chlorhexidine (PERIDEX) 0.12 % solution chlorhexidine gluconate 0.12 % mouthwash     • ciprofloxacin (CIPRO) 500 MG tablet      • clonazePAM (KlonoPIN) 0.5 MG tablet TK 1 T PO TID  3   • Cyanocobalamin 1000 MCG lozenge Take 1,000 mcg by mouth Daily. 90 lozenge 3   • docusate sodium (Stool Softener) 100 MG capsule stool softener capsules     • ELIQUIS 5 MG tablet tablet TAKE 1 TABLET BY MOUTH TWICE DAILY 180 tablet 1   • eszopiclone (LUNESTA) 3 MG tablet Take 3 mg by mouth Daily.     • FLUoxetine (PROzac) 40 MG capsule TK ONE C PO  QAM  2   • fluticasone-salmeterol (WIXELA INHUB) 250-50 MCG/DOSE DISKUS Wixela Inhub 250 mcg-50 mcg/dose powder for inhalation     • Fluzone High-Dose Quadrivalent 0.7 ML suspension prefilled syringe ADM 0.7ML IM UTD     • gabapentin (NEURONTIN) 400 MG capsule TK 1 C PO BID FOR 30 DAYS UTD  5   • hepatitis A (Havrix) 1440 EL U/ML vaccine Havrix (PF) 1,440 HENRY unit/mL intramuscular syringe     • influenza vac split high-dose (FLUZONE HIGH DOSE) 0.5 ML suspension prefilled syringe injection Fluzone High-Dose 7965-3627 (PF) 180 mcg/0.5 mL intramuscular syringe     • methocarbamol (ROBAXIN) 750 MG tablet methocarbamol 750 mg tablet   TAKE 1 TABLET BY MOUTH FOUR TIMES DAILY AS DIRECTED     • nitrofurantoin (MACRODANTIN) 100 MG capsule nitrofurantoin monohydrate/macrocrystals 100 mg capsule     • nitrofurantoin,  macrocrystal-monohydrate, (MACROBID) 100 MG capsule      • oxybutynin XL (DITROPAN-XL) 10 MG 24 hr tablet Take 10 mg by mouth Daily.     • oxyCODONE-acetaminophen (PERCOCET) 7.5-325 MG per tablet TK 1 T PO TID FOR 30 DAYS  0   • pantoprazole (PROTONIX) 20 MG EC tablet TK 1 T PO D     • pravastatin (PRAVACHOL) 20 MG tablet TK 1 T PO  QHS  1   • temazepam (RESTORIL) 30 MG capsule TK ONE C PO  QHS  5   • traZODone (DESYREL) 100 MG tablet TK 1 T PO  QHS  3   • traZODone (DESYREL) 50 MG tablet 100 mg. TK 1 T PO  QHS  4   • venlafaxine XR (EFFEXOR-XR) 150 MG 24 hr capsule venlafaxine  mg capsule,extended release 24 hr       No current facility-administered medications on file prior to visit.         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 Negative for activity change, chills and fever.   HENT: Positive for hearing loss. Negative for mouth sores, trouble swallowing and voice change.    Eyes: Negative for pain and visual disturbance.   Respiratory: Positive for cough and shortness of breath. Negative for wheezing.   "  Cardiovascular: Negative for chest pain and palpitations.   Gastrointestinal: Negative for abdominal pain, constipation, diarrhea, nausea and vomiting.   Endocrine: Positive for cold intolerance.   Genitourinary: Negative for difficulty urinating, frequency and urgency.   Musculoskeletal: Positive for arthralgias, back pain and myalgias. Negative for joint swelling.   Skin: Negative for rash.   Neurological: Positive for headaches. Negative for dizziness, seizures and weakness.   Hematological: Negative for adenopathy. Does not bruise/bleed easily.   Psychiatric/Behavioral: Positive for dysphoric mood and suicidal ideas. Negative for behavioral problems and confusion. The patient is not nervous/anxious.         Administer PHQ and document follow-up plan if positive         Patient screened positive for depression based on a PHQ-9 score of 17 on 7/22/2020. Follow-up recommendations include: Patient is already seeing a mental health specialist and is undergoing treatment for depression..      Objective     Vitals:    01/21/21 1258   Height: 160 cm (62.99\")     Current Status 7/22/2020   ECOG score 1       Physical Exam   Constitutional: She is oriented to person, place, and time. She appears well-developed. No distress.   Morbidly obese female seated in a wheelchair in no acute distress.   HENT:   Head: Normocephalic.   Eyes: Pupils are equal, round, and reactive to light. Conjunctivae are normal. No scleral icterus.   Neck: Normal range of motion. Neck supple. No JVD present. No thyromegaly present.   Cardiovascular: Normal rate and regular rhythm. Exam reveals no gallop and no friction rub.   No murmur heard.  Pulmonary/Chest: Effort normal. She has no wheezes. She has rales.   Abdominal: Soft. She exhibits no distension and no mass. There is no abdominal tenderness.   Musculoskeletal: Normal range of motion. No deformity.   Lymphadenopathy:     She has no cervical adenopathy.   Neurological: She is alert and " oriented to person, place, and time. She has normal reflexes. No cranial nerve deficit.   Skin: Skin is warm and dry. No rash noted. No erythema.   Psychiatric: Her behavior is normal. Judgment normal.   Flat affect   Repeated and unchanged on the visit of 7/22/2020    RECENT LABS:  Hematology WBC   Date Value Ref Range Status   01/14/2021 6.83 3.40 - 10.80 10*3/mm3 Final     RBC   Date Value Ref Range Status   01/14/2021 4.12 3.77 - 5.28 10*6/mm3 Final     Hemoglobin   Date Value Ref Range Status   01/14/2021 12.1 12.0 - 15.9 g/dL Final     Hematocrit   Date Value Ref Range Status   01/14/2021 38.6 34.0 - 46.6 % Final     Platelets   Date Value Ref Range Status   01/14/2021 275 140 - 450 10*3/mm3 Final        Lab Results   Component Value Date    [id number] 1,428 (H) 01/14/2021       Lab Results   Component Value Date    IRON 64 01/14/2021    TIBC 286 01/14/2021    FERRITIN 279.80 (H) 01/14/2021   Iron Sat 22%    Assessment/Plan   1.  Lifelong anticoagulation due to prior history of deep vein thromboses and very poor mobility and sedentary lifestyle.  She currently seems to be doing well on 5 mg of  Eliquis twice daily.  2.  Prior history of iron deficiency requiring IV iron therapy in the setting of previous gastric bypass surgery.  She most recently received 2 doses of IV Injectafer in November 2019.  3.  Morbid obesity.  Patient is wheelchair bound.  4.  Mild leukopenia     Plan  1.  Continue anticoagulation long-term with Eliquis 5 mg po BID  2.  Because of her mobility issues she and her  have requested that she follow-up primarily with her primary care physician and could be referred back to us in the future if she becomes more anemic and needs additional IV iron.  I think this is very reasonable therefore we will plan to see her on an as-needed basis.

## 2021-01-22 ENCOUNTER — TELEPHONE (OUTPATIENT)
Dept: ONCOLOGY | Facility: CLINIC | Age: 71
End: 2021-01-22

## 2021-01-22 NOTE — TELEPHONE ENCOUNTER
KHLOEW contacted the patient to discuss financial assistance for Eliquis.  The patient stated she is currently paying $127 per month in copay charges which is a hardship.  Unfortunately through discussion I learned the patient's income is too high for her to qualify for free Eliquis through the [facility] patient assistance program.  However, when her  retires on 4/30, their income will be below the cap.      I have inquired with Josephine Montejo if there are any additional options for cost savings for the patient through the copay programs.      I will follow up with the patient after next steps are determined.

## 2021-01-25 ENCOUNTER — TELEPHONE (OUTPATIENT)
Dept: ONCOLOGY | Facility: CLINIC | Age: 71
End: 2021-01-25

## 2021-01-25 ENCOUNTER — DOCUMENTATION (OUTPATIENT)
Dept: ONCOLOGY | Facility: CLINIC | Age: 71
End: 2021-01-25

## 2021-01-25 NOTE — TELEPHONE ENCOUNTER
CSW attempted to contact the patient to let her know she can call the office and schedule an appointment with a NP in order to obtain samples of Eliquis.  Unfortunately the patient does not qualify for the patient assistance program right now, but she may be eligible to apply after April 30th.      The patient did not answer the call, and a voicemail did not .  I will attempt another call at a later time.

## 2021-01-25 NOTE — PROGRESS NOTES
Pt is over income for Eliquis copay assistance through [employer]. Pt will call the office when she is needing Eliquis samples

## 2021-01-25 NOTE — TELEPHONE ENCOUNTER
CSW contacted the patient again and reached her to let her know she will need to schedule an appointment with a NP when she gets closer to needing more Eliquis; samples will be provided.  Patient v/u and will call to schedule.  Currently she has a new bottle, so she will wait until closer to the time of need.

## 2021-02-03 RX ORDER — APIXABAN 5 MG/1
TABLET, FILM COATED ORAL
Qty: 180 TABLET | Refills: 1 | OUTPATIENT
Start: 2021-02-03

## 2021-02-18 ENCOUNTER — DOCUMENTATION (OUTPATIENT)
Dept: ONCOLOGY | Facility: CLINIC | Age: 71
End: 2021-02-18

## 2021-02-18 ENCOUNTER — TELEPHONE (OUTPATIENT)
Dept: ONCOLOGY | Facility: CLINIC | Age: 71
End: 2021-02-18

## 2021-02-18 ENCOUNTER — DOCUMENTATION (OUTPATIENT)
Dept: PHARMACY | Facility: HOSPITAL | Age: 71
End: 2021-02-18

## 2021-02-18 NOTE — PROGRESS NOTES
Site of Appt/Pickup: (n) [facility]; (y) [facility]; (n) [facility];    Prescriber (eliceo) requested the following medication samples to be given to the patient during the Nurse Practitioner visit.    NDC:  none  Drug name: eliquis  Strength: 2.5mg  Total Quantity:  112 (Containers- 8 X Units per Containers- 14)  Lot#:  [id number]  Expiration: 4/22    Product to be placed in the [facility] under 'Patient Specialty Medication' entry and is to be removed at time of visit.

## 2021-02-18 NOTE — TELEPHONE ENCOUNTER
Caller: PATIENT    Relationship to patient:     Best call back number: [phone]    Chief complaint: PATIENT WAS TOLD TO CALL AND MAKE AN APPT FOR THE NURSE PRACTITIONER TO BE ABLE TO GET HER ELIQUIS, SHE CAN COME IN TOMORROW, PLEASE ADVISE?    Type of visit: FOLLOWUP    Requested date: 2-19-21    If rescheduling, when is the original appointment: NA    Additional notes:

## 2021-02-18 NOTE — TELEPHONE ENCOUNTER
PATIENT'S  BILL (ON [facility] VERBAL) RETURNING A CALL, INFORMED OF APPT, AND HE IS NOW SAYING THAT SHE WASN'T SUPPOSE TO BE SEEN TO GET THE SAMPLES, WANTS TO KNOW IF SHE HAS TO BE SEEN, PLEASE ADVISE?    CALL BACK # [phone]

## 2021-02-18 NOTE — PROGRESS NOTES
Pt scheduled for 2/19/2021 at [facility] to  Eliquis samples.    Pt takes 5 mg BID but our 5 mg sample supply is low.    I have sent a message to Velma/Radha/Melissa to place 8 boxes of the Eliquis 2.5 mg into the [facility].

## 2021-02-19 ENCOUNTER — APPOINTMENT (OUTPATIENT)
Dept: LAB | Facility: HOSPITAL | Age: 71
End: 2021-02-19

## 2021-02-23 ENCOUNTER — APPOINTMENT (OUTPATIENT)
Dept: LAB | Facility: HOSPITAL | Age: 71
End: 2021-02-23

## 2021-02-23 ENCOUNTER — OFFICE VISIT (OUTPATIENT)
Dept: ONCOLOGY | Facility: CLINIC | Age: 71
End: 2021-02-23

## 2021-02-23 VITALS
RESPIRATION RATE: 16 BRPM | DIASTOLIC BLOOD PRESSURE: 86 MMHG | WEIGHT: 248 LBS | HEART RATE: 72 BPM | TEMPERATURE: 97.7 F | BODY MASS INDEX: 43.94 KG/M2 | HEIGHT: 63 IN | SYSTOLIC BLOOD PRESSURE: 118 MMHG | OXYGEN SATURATION: 96 %

## 2021-02-23 DIAGNOSIS — Z86.718 HISTORY OF DVT IN ADULTHOOD: Primary | ICD-10-CM

## 2021-02-23 RX ORDER — METHYLNALTREXONE BROMIDE 150 MG/1
TABLET ORAL
COMMUNITY

## 2021-02-23 NOTE — PROGRESS NOTES
.     REASON FOR FOLLOW UP:   Management of anticoagulation-history of DVT poor mobility, sedentary lifestyle    HISTORY OF PRESENT ILLNESS:  The patient is a 70 y.o. year old female  who is here for follow-up with the above-mentioned history.        Past Medical History:   Diagnosis Date   • Allergy    • Anemia    • Arthritis    • Asthma    • Bleeding disorder (CMS/HCC)    • Depression    • Excessive daytime sleepiness    • Fibromyalgia    • H/O DVT (deep venous thrombosis)    • H/O TIA (transient ischemic attack)    • Head trauma    • Migraine    • Panic attacks    • Poor sleep    • Poor vision    • Sleep apnea    • Stroke (CMS/HCC)        MEDICATIONS    Current Outpatient Medications:   •  albuterol (PROVENTIL) (2.5 MG/3ML) 0.083% nebulizer solution, albuterol sulfate 2.5 mg/3 mL (0.083 %) solution for nebulization, Disp: , Rfl:   •  albuterol sulfate HFA (VENTOLIN HFA) 108 (90 Base) MCG/ACT inhaler, Ventolin HFA 90 mcg/actuation aerosol inhaler, Disp: , Rfl:   •  ARIPiprazole (ABILIFY) 15 MG tablet, , Disp: , Rfl:   •  clonazePAM (KlonoPIN) 0.5 MG tablet, TK 1 T PO TID, Disp: , Rfl: 3  •  docusate sodium (Stool Softener) 100 MG capsule, stool softener capsules, Disp: , Rfl:   •  ELIQUIS 5 MG tablet tablet, TAKE 1 TABLET BY MOUTH TWICE DAILY, Disp: 180 tablet, Rfl: 1  •  FLUoxetine (PROzac) 40 MG capsule, TK ONE C PO  QAM, Disp: , Rfl: 2  •  fluticasone-salmeterol (WIXELA INHUB) 250-50 MCG/DOSE DISKUS, Wixela Inhub 250 mcg-50 mcg/dose powder for inhalation, Disp: , Rfl:   •  Fluzone High-Dose Quadrivalent 0.7 ML suspension prefilled syringe, ADM 0.7ML IM UTD, Disp: , Rfl:   •  gabapentin (NEURONTIN) 400 MG capsule, TK 1 C PO BID FOR 30 DAYS UTD, Disp: , Rfl: 5  •  hepatitis A (Havrix) 1440 EL U/ML vaccine, Havrix (PF) 1,440 HENRY unit/mL intramuscular syringe, Disp: , Rfl:   •  influenza vac split high-dose (FLUZONE HIGH DOSE) 0.5 ML suspension prefilled syringe injection, Fluzone High-Dose 8922-7455 (PF) 180  "mcg/0.5 mL intramuscular syringe, Disp: , Rfl:   •  methocarbamol (ROBAXIN) 750 MG tablet, methocarbamol 750 mg tablet  TAKE 1 TABLET BY MOUTH FOUR TIMES DAILY AS DIRECTED, Disp: , Rfl:   •  Methylnaltrexone Bromide (Relistor) 150 MG tablet, Relistor 150 mg tablet  Take 3 tablets every day by oral route as needed for 30 days., Disp: , Rfl:   •  oxybutynin XL (DITROPAN-XL) 10 MG 24 hr tablet, Take 10 mg by mouth Daily., Disp: , Rfl:   •  oxyCODONE-acetaminophen (PERCOCET) 7.5-325 MG per tablet, TK 1 T PO TID FOR 30 DAYS, Disp: , Rfl: 0  •  pantoprazole (PROTONIX) 20 MG EC tablet, TK 1 T PO D, Disp: , Rfl:   •  temazepam (RESTORIL) 30 MG capsule, TK ONE C PO  QHS, Disp: , Rfl: 5  •  traZODone (DESYREL) 100 MG tablet, TK 1 T PO  QHS, Disp: , Rfl: 3  •  venlafaxine XR (EFFEXOR-XR) 150 MG 24 hr capsule, venlafaxine  mg capsule,extended release 24 hr, Disp: , Rfl:     ALLERGIES:     Allergies   Allergen Reactions   • [place] Pollen Allergy Skin Test Anaphylaxis   • Aspirin    • Toradol [Ketorolac Tromethamine]        REVIEW OF SYSTEMS:  Review of Systems   HENT: Negative for nosebleeds.    Gastrointestinal: Negative for anal bleeding and blood in stool.   Genitourinary: Negative for hematuria.   Hematological: Bruises/bleeds easily.              Vitals:    02/23/21 1437   BP: 118/86   Pulse: 72   Resp: 16   Temp: 97.7 °F (36.5 °C)   TempSrc: Skin   SpO2: 96%   Weight: 112 kg (248 lb)   Height: 160 cm (62.99\")   PainSc: 0-No pain     Current Status 7/22/2020   ECOG score 1        PHYSICAL EXAM:    CONSTITUTIONAL:  Patient alert and oriented x3 seated in wheelchair, accompanied by   EYES:  Conjunctiva and lids unremarkable.  PERRLA  RESPIRATORY:  Breathing unlabored, no acute distress.  MUSCULOSKELETAL:  No lower extremity swelling, erythema or calf tenderness  SKIN:  Warm.  No rashes.  PSYCHIATRIC:  Normal judgment and insight.  Normal mood and affect.     RECENT LABS:      Assessment/Plan     1.  " Lifelong anticoagulation due to prior history of deep vein thromboses and very poor mobility and sedentary lifestyle.  She currently seems to be doing well on 5 mg of  Eliquis twice daily.  2.  Prior history of iron deficiency requiring IV iron therapy in the setting of previous gastric bypass surgery.  She most recently received 2 doses of IV Injectafer in November 2019.  3.  Morbid obesity.  Patient is wheelchair bound.  4.  Mild leukopenia        PLAN:  1. I have provided the patient with 1 month supply of Eliquis.  She was given 2.5 mg tablets and is aware to take 2 tablets twice daily for a dose of 5 mg twice daily.  We reviewed the appropriate instructions for medication administration as well as dosing. We have reviewed potential side effects including increased risk of bleeding. The patient understand to call with any questions or concerns.

## 2021-03-19 ENCOUNTER — BULK ORDERING (OUTPATIENT)
Dept: CASE MANAGEMENT | Facility: OTHER | Age: 71
End: 2021-03-19

## 2021-03-19 DIAGNOSIS — Z23 IMMUNIZATION DUE: ICD-10-CM

## 2021-03-22 ENCOUNTER — DOCUMENTATION (OUTPATIENT)
Dept: PHARMACY | Facility: HOSPITAL | Age: 71
End: 2021-03-22

## 2021-03-22 ENCOUNTER — DOCUMENTATION (OUTPATIENT)
Dept: ONCOLOGY | Facility: CLINIC | Age: 71
End: 2021-03-22

## 2021-03-22 NOTE — PROGRESS NOTES
Pt scheduled for 3/23/2021 at [facility] to  Eliquis samples.    I have sent a message to Velma/Radha/Melissa to place 4 boxes of the Eliquis 5 mg into the [facility].

## 2021-03-23 ENCOUNTER — OFFICE VISIT (OUTPATIENT)
Dept: ONCOLOGY | Facility: CLINIC | Age: 71
End: 2021-03-23

## 2021-03-23 ENCOUNTER — IMMUNIZATION (OUTPATIENT)
Dept: VACCINE CLINIC | Facility: HOSPITAL | Age: 71
End: 2021-03-23

## 2021-03-23 ENCOUNTER — APPOINTMENT (OUTPATIENT)
Dept: LAB | Facility: HOSPITAL | Age: 71
End: 2021-03-23

## 2021-03-23 VITALS
HEART RATE: 81 BPM | OXYGEN SATURATION: 99 % | RESPIRATION RATE: 16 BRPM | SYSTOLIC BLOOD PRESSURE: 125 MMHG | BODY MASS INDEX: 43.71 KG/M2 | HEIGHT: 63 IN | WEIGHT: 246.7 LBS | DIASTOLIC BLOOD PRESSURE: 64 MMHG | TEMPERATURE: 97.3 F

## 2021-03-23 DIAGNOSIS — Z79.01 CHRONIC ANTICOAGULATION: ICD-10-CM

## 2021-03-23 DIAGNOSIS — Z86.718 HISTORY OF DVT IN ADULTHOOD: Primary | ICD-10-CM

## 2021-03-23 DIAGNOSIS — Z23 IMMUNIZATION DUE: ICD-10-CM

## 2021-03-23 PROCEDURE — 0001A: CPT | Performed by: INTERNAL MEDICINE

## 2021-03-23 PROCEDURE — 91300 HC SARSCOV02 VAC 30MCG/0.3ML IM: CPT | Performed by: INTERNAL MEDICINE

## 2021-03-23 PROCEDURE — 99212 OFFICE O/P EST SF 10 MIN: CPT | Performed by: NURSE PRACTITIONER

## 2021-03-23 NOTE — PROGRESS NOTES
.     REASON FOR FOLLOW UP:   Management of anticoagulation-history of DVT poor mobility, sedentary lifestyle    HISTORY OF PRESENT ILLNESS:  The patient is a 70 y.o. year old female  who is here for follow-up with the above-mentioned history.  The patient returns today for Eliquis samples.  She denies any recent bleeding or dark stools.  She does have some fatigue and is wondering if her magnesium level is low and will discuss this with primary care.  She does have an upcoming colonoscopy but got instructions per Dr. Mosley to hold prior to this procedure.        Past Medical History:   Diagnosis Date   • Allergy    • Anemia    • Arthritis    • Asthma    • Bleeding disorder (CMS/HCC)    • Depression    • Excessive daytime sleepiness    • Fibromyalgia    • H/O DVT (deep venous thrombosis)    • H/O TIA (transient ischemic attack)    • Head trauma    • Migraine    • Panic attacks    • Poor sleep    • Poor vision    • Sleep apnea    • Stroke (CMS/HCC)        MEDICATIONS    Current Outpatient Medications:   •  albuterol (PROVENTIL) (2.5 MG/3ML) 0.083% nebulizer solution, albuterol sulfate 2.5 mg/3 mL (0.083 %) solution for nebulization, Disp: , Rfl:   •  albuterol sulfate HFA (VENTOLIN HFA) 108 (90 Base) MCG/ACT inhaler, Ventolin HFA 90 mcg/actuation aerosol inhaler, Disp: , Rfl:   •  ARIPiprazole (ABILIFY) 15 MG tablet, , Disp: , Rfl:   •  clonazePAM (KlonoPIN) 0.5 MG tablet, TK 1 T PO TID, Disp: , Rfl: 3  •  Cyanocobalamin 1000 MCG lozenge, cyanocobalamin (vit B-12) 1,000 mcg sublingual lozenge, Disp: , Rfl:   •  docusate sodium (Stool Softener) 100 MG capsule, stool softener capsules, Disp: , Rfl:   •  ELIQUIS 5 MG tablet tablet, TAKE 1 TABLET BY MOUTH TWICE DAILY, Disp: 180 tablet, Rfl: 1  •  FLUoxetine (PROzac) 40 MG capsule, TK ONE C PO  QAM, Disp: , Rfl: 2  •  fluticasone-salmeterol (WIXELA INHUB) 250-50 MCG/DOSE DISKUS, Wixela Inhub 250 mcg-50 mcg/dose powder for inhalation, Disp: , Rfl:   •  Fluzone High-Dose  "Quadrivalent 0.7 ML suspension prefilled syringe, ADM 0.7ML IM UTD, Disp: , Rfl:   •  gabapentin (NEURONTIN) 400 MG capsule, TK 1 C PO BID FOR 30 DAYS UTD, Disp: , Rfl: 5  •  hepatitis A (Havrix) 1440 EL U/ML vaccine, Havrix (PF) 1,440 HENRY unit/mL intramuscular syringe, Disp: , Rfl:   •  influenza vac split high-dose (FLUZONE HIGH DOSE) 0.5 ML suspension prefilled syringe injection, Fluzone High-Dose 4133-9569 (PF) 180 mcg/0.5 mL intramuscular syringe, Disp: , Rfl:   •  methocarbamol (ROBAXIN) 750 MG tablet, methocarbamol 750 mg tablet  TAKE 1 TABLET BY MOUTH FOUR TIMES DAILY AS DIRECTED, Disp: , Rfl:   •  Methylnaltrexone Bromide (Relistor) 150 MG tablet, Relistor 150 mg tablet  Take 3 tablets every day by oral route as needed for 30 days., Disp: , Rfl:   •  oxybutynin XL (DITROPAN-XL) 10 MG 24 hr tablet, Take 10 mg by mouth Daily., Disp: , Rfl:   •  oxyCODONE-acetaminophen (PERCOCET) 7.5-325 MG per tablet, TK 1 T PO TID FOR 30 DAYS, Disp: , Rfl: 0  •  pantoprazole (PROTONIX) 20 MG EC tablet, TK 1 T PO D, Disp: , Rfl:   •  temazepam (RESTORIL) 30 MG capsule, TK ONE C PO  QHS, Disp: , Rfl: 5  •  traZODone (DESYREL) 100 MG tablet, TK 1 T PO  QHS, Disp: , Rfl: 3  •  venlafaxine XR (EFFEXOR-XR) 150 MG 24 hr capsule, venlafaxine  mg capsule,extended release 24 hr, Disp: , Rfl:     ALLERGIES:     Allergies   Allergen Reactions   • Black [place] Pollen Allergy Skin Test Anaphylaxis   • Aspirin    • Toradol [Ketorolac Tromethamine]        REVIEW OF SYSTEMS:  Review of Systems   Constitutional: Positive for fatigue.   HENT: Negative for nosebleeds.    Gastrointestinal: Negative for anal bleeding and blood in stool.   Genitourinary: Negative for hematuria.   Hematological: Bruises/bleeds easily.              Vitals:    03/23/21 1623   BP: 125/64   Pulse: 81   Resp: 16   Temp: 97.3 °F (36.3 °C)   TempSrc: Temporal   SpO2: 99%   Weight: 112 kg (246 lb 11.2 oz)   Height: 160 cm (62.99\")   PainSc:   7   PainLoc: Back "     Current Status 7/22/2020   ECOG score 1        PHYSICAL EXAM:    CONSTITUTIONAL:  Patient alert and oriented x3 seated in wheelchair, accompanied by   EYES:  Conjunctiva and lids unremarkable.  PERRLA  RESPIRATORY:  Breathing unlabored, no acute distress.  MUSCULOSKELETAL:  No lower extremity swelling, erythema or calf tenderness  SKIN:  Warm.  No rashes.  PSYCHIATRIC:  Normal judgment and insight.  Normal mood and affect.   I have reexamined the patient and the results are consistent with the previously documented exam. LYDIA Pritchard     RECENT LABS:      Assessment/Plan     1.  Lifelong anticoagulation due to prior history of deep vein thromboses and very poor mobility and sedentary lifestyle.    Patient continues Eliquis 5 mg twice daily without signs of bleeding.  She does have an upcoming colonoscopy and will hold her Eliquis prior to this under instruction of Dr. Mosley.  2.  Prior history of iron deficiency requiring IV iron therapy in the setting of previous gastric bypass surgery.  She most recently received 2 doses of IV Injectafer in November 2019.  3.  Morbid obesity.  Patient is wheelchair bound.  4.  Mild leukopenia        PLAN:  1. I have provided the patient with 1 month supply of Eliquis.  She was given 5mg tablets and is aware to take 1 tablet twice daily.  We reviewed the appropriate instructions for medication administration as well as dosing. We have reviewed potential side effects including increased risk of bleeding. The patient understand to call with any questions or concerns.

## 2021-04-14 ENCOUNTER — APPOINTMENT (OUTPATIENT)
Dept: VACCINE CLINIC | Facility: HOSPITAL | Age: 71
End: 2021-04-14

## 2021-04-15 ENCOUNTER — DOCUMENTATION (OUTPATIENT)
Dept: ONCOLOGY | Facility: CLINIC | Age: 71
End: 2021-04-15

## 2021-04-15 NOTE — PROGRESS NOTES
Pt scheduled for 4/20/2021 at [facility] to  Eliquis samples.    I have sent a message to Velma/Radha/Melissa to place 4 boxes of the Eliquis 5 mg into the [facility].

## 2021-04-16 ENCOUNTER — DOCUMENTATION (OUTPATIENT)
Dept: PHARMACY | Facility: HOSPITAL | Age: 71
End: 2021-04-16

## 2021-04-16 NOTE — PROGRESS NOTES
Site of Appt/Pickup: (n) [employer]; (y) [employer]; (n) [employer];    Prescriber (eliceo) requested the following medication samples to be given to the patient during the Nurse Practitioner visit.    NDC:  none  Drug name: eliquis  Strength: 5mg  Total Quantity:  56 (Containers- 4 X Units per Containers- 14)  Lot#:  [id number]  Expiration: 3/23    Product to be placed in the [facility] under 'Patient Specialty Medication' entry and is to be removed at time of visit.

## 2021-04-18 ENCOUNTER — TELEPHONE (OUTPATIENT)
Dept: VACCINE CLINIC | Facility: HOSPITAL | Age: 71
End: 2021-04-18

## 2021-04-20 ENCOUNTER — APPOINTMENT (OUTPATIENT)
Dept: LAB | Facility: HOSPITAL | Age: 71
End: 2021-04-20

## 2021-04-20 ENCOUNTER — OFFICE VISIT (OUTPATIENT)
Dept: ONCOLOGY | Facility: CLINIC | Age: 71
End: 2021-04-20

## 2021-04-20 ENCOUNTER — IMMUNIZATION (OUTPATIENT)
Dept: VACCINE CLINIC | Facility: HOSPITAL | Age: 71
End: 2021-04-20

## 2021-04-20 VITALS — RESPIRATION RATE: 17 BRPM | HEIGHT: 63 IN | TEMPERATURE: 97.3 F | BODY MASS INDEX: 43.71 KG/M2

## 2021-04-20 DIAGNOSIS — Z79.01 CHRONIC ANTICOAGULATION: Primary | ICD-10-CM

## 2021-04-20 PROCEDURE — 0002A: CPT | Performed by: INTERNAL MEDICINE

## 2021-04-20 PROCEDURE — 91300 HC SARSCOV02 VAC 30MCG/0.3ML IM: CPT | Performed by: INTERNAL MEDICINE

## 2021-04-20 PROCEDURE — 99213 OFFICE O/P EST LOW 20 MIN: CPT | Performed by: NURSE PRACTITIONER

## 2021-04-20 NOTE — PROGRESS NOTES
.     REASON FOR FOLLOW UP:   Management of anticoagulation-history of DVT poor mobility, sedentary lifestyle    HISTORY OF PRESENT ILLNESS:  The patient is a 70 y.o. year old female  who is here for follow-up with the above-mentioned history. Returns today for Eliquis samples. She has had a hospitalization since last office visit due to hyponatremia with a sodium level of 108 secondary to preparation for colonoscopy. She has since recovered and is doing well. She denies any bleeding during hospitalization though her hemoglobin was low more than likely to hemodilution. She does follow-up with primary care tomorrow and they are rechecking labs.        Past Medical History:   Diagnosis Date   • Allergy    • Anemia    • Arthritis    • Asthma    • Bleeding disorder (CMS/HCC)    • Depression    • Excessive daytime sleepiness    • Fibromyalgia    • H/O DVT (deep venous thrombosis)    • H/O TIA (transient ischemic attack)    • Head trauma    • Migraine    • Panic attacks    • Poor sleep    • Poor vision    • Sleep apnea    • Stroke (CMS/HCC)        MEDICATIONS    Current Outpatient Medications:   •  albuterol (PROVENTIL) (2.5 MG/3ML) 0.083% nebulizer solution, albuterol sulfate 2.5 mg/3 mL (0.083 %) solution for nebulization, Disp: , Rfl:   •  albuterol sulfate HFA (VENTOLIN HFA) 108 (90 Base) MCG/ACT inhaler, Ventolin HFA 90 mcg/actuation aerosol inhaler, Disp: , Rfl:   •  ARIPiprazole (ABILIFY) 15 MG tablet, , Disp: , Rfl:   •  clonazePAM (KlonoPIN) 0.5 MG tablet, TK 1 T PO TID, Disp: , Rfl: 3  •  Cyanocobalamin 1000 MCG lozenge, cyanocobalamin (vit B-12) 1,000 mcg sublingual lozenge, Disp: , Rfl:   •  docusate sodium (Stool Softener) 100 MG capsule, stool softener capsules, Disp: , Rfl:   •  ELIQUIS 5 MG tablet tablet, TAKE 1 TABLET BY MOUTH TWICE DAILY, Disp: 180 tablet, Rfl: 1  •  FLUoxetine (PROzac) 40 MG capsule, TK ONE C PO  QAM, Disp: , Rfl: 2  •  fluticasone-salmeterol (WIXELA INHUB) 250-50 MCG/DOSE DISKUS,  "Wixela Inhub 250 mcg-50 mcg/dose powder for inhalation, Disp: , Rfl:   •  Fluzone High-Dose Quadrivalent 0.7 ML suspension prefilled syringe, ADM 0.7ML IM UTD, Disp: , Rfl:   •  gabapentin (NEURONTIN) 400 MG capsule, TK 1 C PO BID FOR 30 DAYS UTD, Disp: , Rfl: 5  •  hepatitis A (Havrix) 1440 EL U/ML vaccine, Havrix (PF) 1,440 HENRY unit/mL intramuscular syringe, Disp: , Rfl:   •  influenza vac split high-dose (FLUZONE HIGH DOSE) 0.5 ML suspension prefilled syringe injection, Fluzone High-Dose 1408-3596 (PF) 180 mcg/0.5 mL intramuscular syringe, Disp: , Rfl:   •  methocarbamol (ROBAXIN) 750 MG tablet, methocarbamol 750 mg tablet  TAKE 1 TABLET BY MOUTH FOUR TIMES DAILY AS DIRECTED, Disp: , Rfl:   •  Methylnaltrexone Bromide (Relistor) 150 MG tablet, Relistor 150 mg tablet  Take 3 tablets every day by oral route as needed for 30 days., Disp: , Rfl:   •  oxybutynin XL (DITROPAN-XL) 10 MG 24 hr tablet, Take 10 mg by mouth Daily., Disp: , Rfl:   •  oxyCODONE-acetaminophen (PERCOCET) 7.5-325 MG per tablet, TK 1 T PO TID FOR 30 DAYS, Disp: , Rfl: 0  •  pantoprazole (PROTONIX) 20 MG EC tablet, TK 1 T PO D, Disp: , Rfl:   •  temazepam (RESTORIL) 30 MG capsule, TK ONE C PO  QHS, Disp: , Rfl: 5  •  traZODone (DESYREL) 100 MG tablet, TK 1 T PO  QHS, Disp: , Rfl: 3  •  venlafaxine XR (EFFEXOR-XR) 150 MG 24 hr capsule, venlafaxine  mg capsule,extended release 24 hr, Disp: , Rfl:     ALLERGIES:     Allergies   Allergen Reactions   • [place] Pollen Allergy Skin Test Anaphylaxis   • Aspirin    • Toradol [Ketorolac Tromethamine]        REVIEW OF SYSTEMS:  Review of Systems   Constitutional: Positive for fatigue.   HENT: Negative for nosebleeds.    Gastrointestinal: Negative for anal bleeding and blood in stool.   Genitourinary: Negative for hematuria.   Hematological: Bruises/bleeds easily.              Vitals:    04/20/21 1604   Resp: 17   Temp: 97.3 °F (36.3 °C)   TempSrc: Skin   Height: 160 cm (62.99\")     Current " Status 7/22/2020   ECOG score 1        PHYSICAL EXAM:    CONSTITUTIONAL:  Patient alert and oriented x3 seated in wheelchair, accompanied by   EYES:  Conjunctiva and lids unremarkable.  PERRLA  RESPIRATORY:  Breathing unlabored, no acute distress.\  SKIN:  Warm.  No rashes.  PSYCHIATRIC:  Normal judgment and insight.  Normal mood and affect.   I have reexamined the patient and the results are consistent with the previously documented exam. LYDIA Pritchard     RECENT LABS:      Assessment/Plan     1.  Lifelong anticoagulation due to prior history of deep vein thromboses and very poor mobility and sedentary lifestyle.   Patient does continue Eliquis 5 mg twice daily was provided samples due to cost issues. She did have a recent hospitalization with no bleeding issues however and was instructed to continue Eliquis at discharge. Her colonoscopy has been rescheduled to July and she will follow the same plan as previously given to her by Dr. Mosley of holding her Eliquis.   2.  Prior history of iron deficiency requiring IV iron therapy in the setting of previous gastric bypass surgery.  She most recently received 2 doses of IV Injectafer in November 2019. Patient's hemoglobin did decline during her recent hospitalization however this is more than likely hemodilution due to the fluids they were giving her to correct her sodium level. She does follow-up with primary care tomorrow and will request his CBC to be performed.  3.  Morbid obesity.  Patient is wheelchair bound.         PLAN:  1. I have provided the patient with 1 month supply of Eliquis.  She was given 5mg tablets and is aware to take 1 tablet twice daily.  We reviewed the appropriate instructions for medication administration as well as dosing. We have reviewed potential side effects including increased risk of bleeding. The patient understand to call with any questions or concerns. We have set up an appointment for 1 in 2 months at [place] for her  request for Eliquis samples.     I did review hospitalization records from [facility] as well as labs during hospitalization.

## 2021-05-13 ENCOUNTER — DOCUMENTATION (OUTPATIENT)
Dept: PHARMACY | Facility: HOSPITAL | Age: 71
End: 2021-05-13

## 2021-05-13 NOTE — PROGRESS NOTES
Site of Appt/Pickup: (y) [email or web address]; (n) [employer]; (n) [email or web address];    Prescriber (eliceo) requested the following medication samples to be given to the patient during the Nurse Practitioner visit.    NDC:  none  Drug name: eliquis  Strength: 5mg  Total Quantity:  56 (Containers- 4 X Units per Containers- 14)  Lot#:  [id number]  Expiration: 3/23    Product to be placed in the [facility] under 'Patient Specialty Medication' entry and is to be removed at time of visit.

## 2021-05-18 ENCOUNTER — APPOINTMENT (OUTPATIENT)
Dept: OTHER | Facility: HOSPITAL | Age: 71
End: 2021-05-18

## 2021-05-18 ENCOUNTER — OFFICE VISIT (OUTPATIENT)
Dept: ONCOLOGY | Facility: CLINIC | Age: 71
End: 2021-05-18

## 2021-05-18 VITALS
WEIGHT: 233.5 LBS | BODY MASS INDEX: 41.37 KG/M2 | RESPIRATION RATE: 16 BRPM | HEIGHT: 63 IN | SYSTOLIC BLOOD PRESSURE: 122 MMHG | HEART RATE: 66 BPM | OXYGEN SATURATION: 98 % | DIASTOLIC BLOOD PRESSURE: 73 MMHG | TEMPERATURE: 97.3 F

## 2021-05-18 DIAGNOSIS — Z79.01 CHRONIC ANTICOAGULATION: Primary | ICD-10-CM

## 2021-05-18 PROCEDURE — 99212 OFFICE O/P EST SF 10 MIN: CPT | Performed by: NURSE PRACTITIONER

## 2021-05-18 NOTE — PROGRESS NOTES
.     REASON FOR FOLLOW UP:   Management of anticoagulation-history of DVT poor mobility, sedentary lifestyle    HISTORY OF PRESENT ILLNESS:  The patient is a 70 y.o. year old female  who is here for follow-up with the above-mentioned history. Returns today 5/18/21 for Eliquis samples.  Patient has been taking her Eliquis appropriately with one 5 mg tablet twice a day. She had a recent hospitalization on 4/14/21 due to hyponatremia with a sodium level of 108 secondary to preparation for colonoscopy. She has since recovered and is doing well.  She denies any new symptoms of clotting including shortness of breath, chest pain, or leg swelling.  Patient also denies any signs and symptoms of bleeding such as hematochezia or hematuria.         Past Medical History:   Diagnosis Date   • Allergy    • Anemia    • Arthritis    • Asthma    • Bleeding disorder (CMS/HCC)    • Depression    • Excessive daytime sleepiness    • Fibromyalgia    • H/O DVT (deep venous thrombosis)    • H/O TIA (transient ischemic attack)    • Head trauma    • Migraine    • Panic attacks    • Poor sleep    • Poor vision    • Sleep apnea    • Stroke (CMS/HCC)        MEDICATIONS    Current Outpatient Medications:   •  albuterol (PROVENTIL) (2.5 MG/3ML) 0.083% nebulizer solution, albuterol sulfate 2.5 mg/3 mL (0.083 %) solution for nebulization, Disp: , Rfl:   •  albuterol sulfate HFA (VENTOLIN HFA) 108 (90 Base) MCG/ACT inhaler, Ventolin HFA 90 mcg/actuation aerosol inhaler, Disp: , Rfl:   •  ARIPiprazole (ABILIFY) 15 MG tablet, , Disp: , Rfl:   •  clonazePAM (KlonoPIN) 0.5 MG tablet, TK 1 T PO TID, Disp: , Rfl: 3  •  Cyanocobalamin 1000 MCG lozenge, cyanocobalamin (vit B-12) 1,000 mcg sublingual lozenge, Disp: , Rfl:   •  docusate sodium (Stool Softener) 100 MG capsule, stool softener capsules, Disp: , Rfl:   •  ELIQUIS 5 MG tablet tablet, TAKE 1 TABLET BY MOUTH TWICE DAILY, Disp: 180 tablet, Rfl: 1  •  FLUoxetine (PROzac) 40 MG capsule, TK ONE C PO   QAM, Disp: , Rfl: 2  •  fluticasone-salmeterol (WIXELA INHUB) 250-50 MCG/DOSE DISKUS, Wixela Inhub 250 mcg-50 mcg/dose powder for inhalation, Disp: , Rfl:   •  Fluzone High-Dose Quadrivalent 0.7 ML suspension prefilled syringe, ADM 0.7ML IM UTD, Disp: , Rfl:   •  gabapentin (NEURONTIN) 400 MG capsule, TK 1 C PO BID FOR 30 DAYS UTD, Disp: , Rfl: 5  •  hepatitis A (Havrix) 1440 EL U/ML vaccine, Havrix (PF) 1,440 HENRY unit/mL intramuscular syringe, Disp: , Rfl:   •  influenza vac split high-dose (FLUZONE HIGH DOSE) 0.5 ML suspension prefilled syringe injection, Fluzone High-Dose 3752-8376 (PF) 180 mcg/0.5 mL intramuscular syringe, Disp: , Rfl:   •  methocarbamol (ROBAXIN) 750 MG tablet, methocarbamol 750 mg tablet  TAKE 1 TABLET BY MOUTH FOUR TIMES DAILY AS DIRECTED, Disp: , Rfl:   •  Methylnaltrexone Bromide (Relistor) 150 MG tablet, Relistor 150 mg tablet  Take 3 tablets every day by oral route as needed for 30 days., Disp: , Rfl:   •  oxybutynin XL (DITROPAN-XL) 10 MG 24 hr tablet, Take 10 mg by mouth Daily., Disp: , Rfl:   •  oxyCODONE-acetaminophen (PERCOCET) 7.5-325 MG per tablet, TK 1 T PO TID FOR 30 DAYS, Disp: , Rfl: 0  •  pantoprazole (PROTONIX) 20 MG EC tablet, TK 1 T PO D, Disp: , Rfl:   •  temazepam (RESTORIL) 30 MG capsule, TK ONE C PO  QHS, Disp: , Rfl: 5  •  traZODone (DESYREL) 100 MG tablet, TK 1 T PO  QHS, Disp: , Rfl: 3  •  venlafaxine XR (EFFEXOR-XR) 150 MG 24 hr capsule, venlafaxine  mg capsule,extended release 24 hr, Disp: , Rfl:     ALLERGIES:     Allergies   Allergen Reactions   • Black [place] Pollen Allergy Skin Test Anaphylaxis   • Aspirin    • Toradol [Ketorolac Tromethamine]        REVIEW OF SYSTEMS:  Review of Systems   Constitutional: Positive for fatigue (unchanged).   HENT: Negative for nosebleeds.    Respiratory: Negative.    Cardiovascular: Negative.    Gastrointestinal: Negative for anal bleeding and blood in stool.   Genitourinary: Negative for hematuria.   Hematological:  Bruises/bleeds easily.              There were no vitals filed for this visit.  Current Status 7/22/2020   ECOG score 1        PHYSICAL EXAM:    CONSTITUTIONAL:  Patient alert and oriented x3 seated in wheelchair, accompanied by   EYES:  Conjunctiva and lids unremarkable.  PERRLA  RESPIRATORY:  Breathing unlabored, no acute distress.\  SKIN:  Warm.  No rashes.  PSYCHIATRIC:  Normal judgment and insight.  Normal mood and affect.   I have reexamined the patient and the results are consistent with the previously documented exam. LYDIA Farfan     RECENT LABS:      Assessment/Plan     1.  Lifelong anticoagulation due to prior history of deep vein thromboses and very poor mobility and sedentary lifestyle.   Patient does continue Eliquis 5 mg twice daily was provided samples due to cost issues. She did have a recent hospitalization 4/14/21 with no bleeding issues however and was instructed to continue Eliquis at discharge. Her colonoscopy has been rescheduled to July and she will follow the same plan as previously given to her by Dr. Mosley of holding her Eliquis.   2.  Prior history of iron deficiency requiring IV iron therapy in the setting of previous gastric bypass surgery.  She most recently received 2 doses of IV Injectafer in November 2019. Patient's hemoglobin did decline during her recent hospitalization however this is more than likely hemodilution due to the fluids they were giving her to correct her sodium level. She has been following with her PCP for this.  3.  Morbid obesity.  Patient is wheelchair bound.         PLAN:  1. I have again provided the patient with 1 month supply of Eliquis.  She was given 5mg tablets and is aware to take 1 tablet twice daily.  We reviewed the appropriate instructions for medication administration as well as dosing. We have reviewed potential side effects including increased risk of bleeding. The patient understand to call with any questions or concerns. We have set  up an appointment for 6/15/21 at [place] for her request for Eliquis samples.    .

## 2021-06-14 ENCOUNTER — DOCUMENTATION (OUTPATIENT)
Dept: PHARMACY | Facility: HOSPITAL | Age: 71
End: 2021-06-14

## 2021-06-14 NOTE — PROGRESS NOTES
Site of Appt/Pickup: (y) [employer]; (n) [employer]; (n) [employer];    Prescriber (eliceo) requested the following medication samples to be given to the patient during the Nurse Practitioner visit.    NDC:  none  Drug name: eliquis  Strength: 5mg  Total Quantity:  56 (Containers- 4 X Units per Containers- 14)  Lot#:  [id number]  Expiration: 4/23    Product to be placed in the [employer] under 'Patient Specialty Medication' entry and is to be removed at time of visit.  y

## 2021-06-15 ENCOUNTER — APPOINTMENT (OUTPATIENT)
Dept: OTHER | Facility: HOSPITAL | Age: 71
End: 2021-06-15

## 2021-06-15 ENCOUNTER — OFFICE VISIT (OUTPATIENT)
Dept: ONCOLOGY | Facility: CLINIC | Age: 71
End: 2021-06-15

## 2021-06-15 VITALS
SYSTOLIC BLOOD PRESSURE: 121 MMHG | TEMPERATURE: 97.3 F | OXYGEN SATURATION: 93 % | WEIGHT: 232.6 LBS | HEIGHT: 63 IN | DIASTOLIC BLOOD PRESSURE: 74 MMHG | BODY MASS INDEX: 41.21 KG/M2 | HEART RATE: 72 BPM | RESPIRATION RATE: 18 BRPM

## 2021-06-15 DIAGNOSIS — Z51.81 ANTICOAGULATION MANAGEMENT ENCOUNTER: ICD-10-CM

## 2021-06-15 DIAGNOSIS — Z86.718 HISTORY OF DVT IN ADULTHOOD: Primary | ICD-10-CM

## 2021-06-15 DIAGNOSIS — Z79.01 ANTICOAGULATION MANAGEMENT ENCOUNTER: ICD-10-CM

## 2021-06-15 PROCEDURE — 99213 OFFICE O/P EST LOW 20 MIN: CPT | Performed by: NURSE PRACTITIONER

## 2021-06-15 RX ORDER — SENNA PLUS 8.6 MG/1
1 TABLET ORAL DAILY
COMMUNITY
End: 2021-11-03

## 2021-06-15 NOTE — PROGRESS NOTES
.     REASON FOR FOLLOW UP:   Management of anticoagulation-history of DVT poor mobility, sedentary lifestyle    HISTORY OF PRESENT ILLNESS:  The patient is a 70 y.o. year old female with the above-mentioned history who is here today for Eliquis samples.  She takes Eliquis 5 mg twice daily.  She denies any issues with bleeding.  She has no other new problems or concerns today.    Past Medical History:   Diagnosis Date   • Allergy    • Anemia    • Arthritis    • Asthma    • Bleeding disorder (CMS/HCC)    • Depression    • Excessive daytime sleepiness    • Fibromyalgia    • H/O DVT (deep venous thrombosis)    • H/O TIA (transient ischemic attack)    • Head trauma    • Migraine    • Panic attacks    • Poor sleep    • Poor vision    • Sleep apnea    • Stroke (CMS/HCC)        MEDICATIONS    Current Outpatient Medications:   •  albuterol (PROVENTIL) (2.5 MG/3ML) 0.083% nebulizer solution, albuterol sulfate 2.5 mg/3 mL (0.083 %) solution for nebulization, Disp: , Rfl:   •  albuterol sulfate HFA (VENTOLIN HFA) 108 (90 Base) MCG/ACT inhaler, Ventolin HFA 90 mcg/actuation aerosol inhaler, Disp: , Rfl:   •  ARIPiprazole (ABILIFY) 15 MG tablet, , Disp: , Rfl:   •  clonazePAM (KlonoPIN) 0.5 MG tablet, TK 1 T PO TID, Disp: , Rfl: 3  •  Cyanocobalamin 1000 MCG lozenge, cyanocobalamin (vit B-12) 1,000 mcg sublingual lozenge, Disp: , Rfl:   •  docusate sodium (Stool Softener) 100 MG capsule, stool softener capsules, Disp: , Rfl:   •  ELIQUIS 5 MG tablet tablet, TAKE 1 TABLET BY MOUTH TWICE DAILY, Disp: 180 tablet, Rfl: 1  •  FLUoxetine (PROzac) 40 MG capsule, TK ONE C PO  QAM, Disp: , Rfl: 2  •  fluticasone-salmeterol (WIXELA INHUB) 250-50 MCG/DOSE DISKUS, Wixela Inhub 250 mcg-50 mcg/dose powder for inhalation, Disp: , Rfl:   •  Fluzone High-Dose Quadrivalent 0.7 ML suspension prefilled syringe, ADM 0.7ML IM UTD, Disp: , Rfl:   •  gabapentin (NEURONTIN) 400 MG capsule, TK 1 C PO BID FOR 30 DAYS UTD, Disp: , Rfl: 5  •  hepatitis A  "(Havrix) 1440 EL U/ML vaccine, Havrix (PF) 1,440 HENRY unit/mL intramuscular syringe, Disp: , Rfl:   •  influenza vac split high-dose (FLUZONE HIGH DOSE) 0.5 ML suspension prefilled syringe injection, Fluzone High-Dose 0423-9321 (PF) 180 mcg/0.5 mL intramuscular syringe, Disp: , Rfl:   •  methocarbamol (ROBAXIN) 750 MG tablet, methocarbamol 750 mg tablet  TAKE 1 TABLET BY MOUTH FOUR TIMES DAILY AS DIRECTED, Disp: , Rfl:   •  Methylnaltrexone Bromide (Relistor) 150 MG tablet, Relistor 150 mg tablet  Take 3 tablets every day by oral route as needed for 30 days., Disp: , Rfl:   •  oxybutynin XL (DITROPAN-XL) 10 MG 24 hr tablet, Take 10 mg by mouth Daily., Disp: , Rfl:   •  oxyCODONE-acetaminophen (PERCOCET) 7.5-325 MG per tablet, TK 1 T PO TID FOR 30 DAYS, Disp: , Rfl: 0  •  pantoprazole (PROTONIX) 20 MG EC tablet, TK 1 T PO D, Disp: , Rfl:   •  senna (senna) 8.6 MG tablet, Take 1 tablet by mouth Daily., Disp: , Rfl:   •  temazepam (RESTORIL) 30 MG capsule, TK ONE C PO  QHS, Disp: , Rfl: 5  •  traZODone (DESYREL) 100 MG tablet, TK 1 T PO  QHS, Disp: , Rfl: 3  •  venlafaxine XR (EFFEXOR-XR) 150 MG 24 hr capsule, venlafaxine  mg capsule,extended release 24 hr, Disp: , Rfl:     ALLERGIES:     Allergies   Allergen Reactions   • Aspirin Anaphylaxis   • Black [place] Pollen Allergy Skin Test Anaphylaxis   • Toradol [Ketorolac Tromethamine] Hallucinations              Vitals:    06/15/21 1318 06/15/21 1329   BP: 121/74    Pulse: (!) 132 72   Resp: 18    Temp: 97.3 °F (36.3 °C)    TempSrc: Temporal    SpO2: (!) 79% 93%   Weight: 106 kg (232 lb 9.6 oz)    Height: 160 cm (62.99\")    PainSc: 0-No pain      Current Status 6/15/2021   ECOG score 1        Physical Exam  Constitutional:       Appearance: She is well-developed.      Comments: Seated in a wheelchair, accompanied by her , in no acute distress   Pulmonary:      Effort: Pulmonary effort is normal. No respiratory distress.   Skin:     General: Skin is warm and " dry.   Neurological:      Mental Status: She is alert and oriented to person, place, and time.       RECENT LABS:      Assessment/Plan     1.  Lifelong anticoagulation due to prior history of deep vein thromboses and very poor mobility and sedentary lifestyle.   Patient does continue Eliquis 5 mg twice daily was provided samples due to cost issues. She did have a recent hospitalization 4/14/21 with no bleeding issues however and was instructed to continue Eliquis at discharge. Her colonoscopy has been rescheduled to July and she will follow the same plan as previously given to her by Dr. Mosley of holding her Eliquis.     2.  Prior history of iron deficiency requiring IV iron therapy in the setting of previous gastric bypass surgery.  She most recently received 2 doses of IV Injectafer in November 2019. Patient's hemoglobin did decline during her recent hospitalization however this is more than likely hemodilution due to the fluids they were giving her to correct her sodium level. She has been following with her PCP for this.    3.  Morbid obesity.  Patient is wheelchair bound.         PLAN:  1. I have provided the patient with 1 month supply of Eliquis.  She was given 5 mg tablets #56.  Patient is aware to take 1 tablet twice daily.  She is aware of the appropriate instructions for medication administration as well as dosing.  She is aware of potential side effects including increased risk of bleeding.  Patient will call with any questions or concerns.  2. We will schedule patient for monthly follow-up appointment for Eliquis samples.      .

## 2021-07-08 ENCOUNTER — DOCUMENTATION (OUTPATIENT)
Dept: PHARMACY | Facility: HOSPITAL | Age: 71
End: 2021-07-08

## 2021-07-08 NOTE — PROGRESS NOTES
Site of Appt/Pickup: (y) [employer]; (n) [employer]; (n) [employer];    Prescriber (eliceo) requested the following medication samples to be given to the patient during the Nurse Practitioner visit.    NDC:  none  Drug name: eliquis  Strength: 5mg  Total Quantity:  56 (Containers- 4 X Units per Containers- 14)  Lot#:  [id number]  Expiration: 7/23    Product to be placed in the [employer] under 'Patient Specialty Medication' entry and is to be removed at time of visit.

## 2021-07-13 ENCOUNTER — APPOINTMENT (OUTPATIENT)
Dept: OTHER | Facility: HOSPITAL | Age: 71
End: 2021-07-13

## 2021-07-13 ENCOUNTER — OFFICE VISIT (OUTPATIENT)
Dept: ONCOLOGY | Facility: CLINIC | Age: 71
End: 2021-07-13

## 2021-07-13 VITALS
BODY MASS INDEX: 40.63 KG/M2 | HEIGHT: 63 IN | OXYGEN SATURATION: 94 % | HEART RATE: 61 BPM | RESPIRATION RATE: 17 BRPM | TEMPERATURE: 97.5 F | WEIGHT: 229.3 LBS | DIASTOLIC BLOOD PRESSURE: 71 MMHG | SYSTOLIC BLOOD PRESSURE: 124 MMHG

## 2021-07-13 DIAGNOSIS — Z86.718 HISTORY OF DVT IN ADULTHOOD: Primary | ICD-10-CM

## 2021-07-13 PROCEDURE — 99213 OFFICE O/P EST LOW 20 MIN: CPT | Performed by: NURSE PRACTITIONER

## 2021-07-13 NOTE — PROGRESS NOTES
.     REASON FOR FOLLOW UP:   Management of anticoagulation-history of DVT poor mobility, sedentary lifestyle    HISTORY OF PRESENT ILLNESS:  The patient is a 70 y.o. year old female with the above-mentioned history who is here today for Eliquis samples.  She continues taking 5 mg of Eliquis twice daily.  She denies any chest pain, shortness of breath, new lower extremity swelling, or any bleeding issues.  She is tolerating this well and has no other concerns today.  Of note, the patient did recently undergo a colonoscopy with 5 polyps removed and a rectal skin tag.  She was referred to a proctologist for removal.  She is awaiting biopsy results.    Past Medical History:   Diagnosis Date   • Allergy    • Anemia    • Arthritis    • Asthma    • Bleeding disorder (CMS/HCC)    • Depression    • Excessive daytime sleepiness    • Fibromyalgia    • H/O DVT (deep venous thrombosis)    • H/O TIA (transient ischemic attack)    • Head trauma    • Migraine    • Panic attacks    • Poor sleep    • Poor vision    • Sleep apnea    • Stroke (CMS/HCC)        MEDICATIONS    Current Outpatient Medications:   •  albuterol (PROVENTIL) (2.5 MG/3ML) 0.083% nebulizer solution, albuterol sulfate 2.5 mg/3 mL (0.083 %) solution for nebulization, Disp: , Rfl:   •  albuterol sulfate HFA (VENTOLIN HFA) 108 (90 Base) MCG/ACT inhaler, Ventolin HFA 90 mcg/actuation aerosol inhaler, Disp: , Rfl:   •  ARIPiprazole (ABILIFY) 15 MG tablet, , Disp: , Rfl:   •  clonazePAM (KlonoPIN) 0.5 MG tablet, TK 1 T PO TID, Disp: , Rfl: 3  •  Cyanocobalamin 1000 MCG lozenge, cyanocobalamin (vit B-12) 1,000 mcg sublingual lozenge, Disp: , Rfl:   •  docusate sodium (Stool Softener) 100 MG capsule, stool softener capsules, Disp: , Rfl:   •  ELIQUIS 5 MG tablet tablet, TAKE 1 TABLET BY MOUTH TWICE DAILY, Disp: 180 tablet, Rfl: 1  •  FLUoxetine (PROzac) 40 MG capsule, TK ONE C PO  QAM, Disp: , Rfl: 2  •  fluticasone-salmeterol (WIXELA INHUB) 250-50 MCG/DOSE DISKUS,  Wixela Inhub 250 mcg-50 mcg/dose powder for inhalation, Disp: , Rfl:   •  Fluzone High-Dose Quadrivalent 0.7 ML suspension prefilled syringe, ADM 0.7ML IM UTD, Disp: , Rfl:   •  gabapentin (NEURONTIN) 400 MG capsule, TK 1 C PO BID FOR 30 DAYS UTD, Disp: , Rfl: 5  •  hepatitis A (Havrix) 1440 EL U/ML vaccine, Havrix (PF) 1,440 HENRY unit/mL intramuscular syringe, Disp: , Rfl:   •  influenza vac split high-dose (FLUZONE HIGH DOSE) 0.5 ML suspension prefilled syringe injection, Fluzone High-Dose 2788-4082 (PF) 180 mcg/0.5 mL intramuscular syringe, Disp: , Rfl:   •  methocarbamol (ROBAXIN) 750 MG tablet, methocarbamol 750 mg tablet  TAKE 1 TABLET BY MOUTH FOUR TIMES DAILY AS DIRECTED, Disp: , Rfl:   •  Methylnaltrexone Bromide (Relistor) 150 MG tablet, Relistor 150 mg tablet  Take 3 tablets every day by oral route as needed for 30 days., Disp: , Rfl:   •  oxybutynin XL (DITROPAN-XL) 10 MG 24 hr tablet, Take 10 mg by mouth Daily., Disp: , Rfl:   •  oxyCODONE-acetaminophen (PERCOCET) 7.5-325 MG per tablet, TK 1 T PO TID FOR 30 DAYS, Disp: , Rfl: 0  •  pantoprazole (PROTONIX) 20 MG EC tablet, TK 1 T PO D, Disp: , Rfl:   •  senna (senna) 8.6 MG tablet, Take 1 tablet by mouth Daily., Disp: , Rfl:   •  temazepam (RESTORIL) 30 MG capsule, TK ONE C PO  QHS, Disp: , Rfl: 5  •  traZODone (DESYREL) 100 MG tablet, TK 1 T PO  QHS, Disp: , Rfl: 3  •  venlafaxine XR (EFFEXOR-XR) 150 MG 24 hr capsule, venlafaxine  mg capsule,extended release 24 hr, Disp: , Rfl:     ALLERGIES:     Allergies   Allergen Reactions   • Aspirin Anaphylaxis   • [place] Pollen Allergy Skin Test Anaphylaxis   • Toradol [Ketorolac Tromethamine] Hallucinations              There were no vitals filed for this visit.  Current Status 6/15/2021   ECOG score 1        Physical Exam  Constitutional:       Appearance: She is well-developed.      Comments: Seated in a wheelchair, accompanied by her , in no acute distress   Pulmonary:      Effort:  Pulmonary effort is normal. No respiratory distress.   Skin:     General: Skin is warm and dry.   Neurological:      Mental Status: She is alert and oriented to person, place, and time.       RECENT LABS:      Assessment/Plan     1.  Lifelong anticoagulation due to prior history of deep vein thromboses and very poor mobility and sedentary lifestyle.    The patient does continue on Eliquis at 5 mg twice daily and is tolerating this well.  She was provided a 1 month supply of Eliquis samples due to financial hardship.  She does have a colonoscopy scheduled later this month and will follow previously provided instructions for withholding the medication per Dr. Mosley.  She is already scheduled to return in 1 month for additional Eliquis samples.    2.  Prior history of iron deficiency requiring IV iron therapy in the setting of previous gastric bypass surgery.  She most recently received 2 doses of IV Injectafer in November 2019. Patient's hemoglobin did decline during her  hospitalization in April however this is more than likely hemodilution due to the fluids they were giving her to correct her sodium level. She has been following with her PCP for this.    3.  Morbid obesity.  Patient is wheelchair bound.         PLAN:  1. I have again provided the patient with 1 month supply of Eliquis.  She was given 5 mg tablets #56.  Patient is aware to take 1 tablet twice daily.  She is aware of the appropriate instructions for medication administration as well as dosing.  She is aware of possible side effects including the increased risk of bleeding.   2.  I have asked her to call with any questions or issues prior to her next visit.  We will schedule patient for monthly follow-up appointment for Eliquis samples.      .

## 2021-07-16 ENCOUNTER — TELEPHONE (OUTPATIENT)
Dept: ONCOLOGY | Facility: CLINIC | Age: 71
End: 2021-07-16

## 2021-07-16 NOTE — TELEPHONE ENCOUNTER
Caller: Jose Haywood    Relationship: Emergency Contact    Best call back number: [phone]    What is the best time to reach you: ASAP    Who are you requesting to speak with (clinical staff, provider,  specific staff member): LIBRADO LUNDY    Do you know the name of the person who called:     What was the call regarding: MEDICATION ASSISTANCE    Do you require a callback: YES

## 2021-07-16 NOTE — TELEPHONE ENCOUNTER
HUB message from pts , Jose forwarded to me. He called the office requesting to speak with me (asked for me by name).    I returned the call and spoke to Sun. She states they were calling because Beryl Griselda told them a few months ago to contact the office when their income changed. She states Beryl tried to help her with medication assistance previously (specifically Eliquis). We provide samples to her for the Eliquis through the office but can definitely try through [employer] again as the income has changed. I informed her that I will send her an application she needs to sign and she will return that to me with her income documentation. Once rec it will be sent to [employer].

## 2021-07-19 ENCOUNTER — DOCUMENTATION (OUTPATIENT)
Dept: ONCOLOGY | Facility: CLINIC | Age: 71
End: 2021-07-19

## 2021-07-19 NOTE — PROGRESS NOTES
Email rec from pts , Jose-he was asking about the specifics of the [employer] application. His email stated that Sun thought the application would cover any medication regardless of provider. I informed him the application that was sent was for the Eliquis only but I would be more than happy to assist with any medication ordered through our office.    He returned a reply stating they will keep things as they are for now. He has elected to NOT proceed with the application for FREE Eliquis.    Thank you for the update. Since we are being provided for so well for now, we will leave things status quo.    Again thanks for your assistance.   Jose Haywood

## 2021-08-04 ENCOUNTER — DOCUMENTATION (OUTPATIENT)
Dept: PHARMACY | Facility: HOSPITAL | Age: 71
End: 2021-08-04

## 2021-08-04 NOTE — PROGRESS NOTES
Site of Appt/Pickup: (y) [employer]; (n) [employer]; (n) [employer];    Prescriber (eliceo) requested the following medication samples to be given to the patient during the Nurse Practitioner visit.    NDC:  none  Drug name: eliquis  Strength: 5mg  Total Quantity:  56 (Containers- 4 X Units per Containers- 14)  Lot#:  [id number]  Expiration: 7/23    Product to be placed in the [employer] under 'Patient Specialty Medication' entry and is to be removed at time of visit.

## 2021-08-10 ENCOUNTER — OFFICE VISIT (OUTPATIENT)
Dept: ONCOLOGY | Facility: CLINIC | Age: 71
End: 2021-08-10

## 2021-08-10 ENCOUNTER — APPOINTMENT (OUTPATIENT)
Dept: OTHER | Facility: HOSPITAL | Age: 71
End: 2021-08-10

## 2021-08-10 VITALS — TEMPERATURE: 97.3 F

## 2021-08-10 DIAGNOSIS — Z79.01 ANTICOAGULATION MANAGEMENT ENCOUNTER: ICD-10-CM

## 2021-08-10 DIAGNOSIS — Z86.718 HISTORY OF DVT IN ADULTHOOD: Primary | ICD-10-CM

## 2021-08-10 DIAGNOSIS — Z51.81 ANTICOAGULATION MANAGEMENT ENCOUNTER: ICD-10-CM

## 2021-08-10 PROCEDURE — 99213 OFFICE O/P EST LOW 20 MIN: CPT | Performed by: NURSE PRACTITIONER

## 2021-08-10 NOTE — PROGRESS NOTES
.     REASON FOR FOLLOW UP:   Management of anticoagulation-history of DVT poor mobility, sedentary lifestyle    HISTORY OF PRESENT ILLNESS:  The patient is a 70 y.o. year old female with a history of DVT on chronic anticoagulation with Eliquis 5 mg daily.  She is tolerating this quite well and denies any bleeding issues.  Patient is here today for Eliquis samples, which we are providing her with due to financial hardship.  She states that she may have to have oral surgery, and is not sure if she will need to hold her blood thinner prior to that or not.  They will notify our office if there is any questions.      Past Medical History:   Diagnosis Date   • Allergy    • Anemia    • Arthritis    • Asthma    • Bleeding disorder (CMS/HCC)    • Depression    • Excessive daytime sleepiness    • Fibromyalgia    • H/O DVT (deep venous thrombosis)    • H/O TIA (transient ischemic attack)    • Head trauma    • Migraine    • Panic attacks    • Poor sleep    • Poor vision    • Sleep apnea    • Stroke (CMS/HCC)        MEDICATIONS    Current Outpatient Medications:   •  albuterol (PROVENTIL) (2.5 MG/3ML) 0.083% nebulizer solution, albuterol sulfate 2.5 mg/3 mL (0.083 %) solution for nebulization, Disp: , Rfl:   •  albuterol sulfate HFA (VENTOLIN HFA) 108 (90 Base) MCG/ACT inhaler, Ventolin HFA 90 mcg/actuation aerosol inhaler, Disp: , Rfl:   •  ARIPiprazole (ABILIFY) 15 MG tablet, , Disp: , Rfl:   •  clonazePAM (KlonoPIN) 0.5 MG tablet, TK 1 T PO TID, Disp: , Rfl: 3  •  Cyanocobalamin 1000 MCG lozenge, cyanocobalamin (vit B-12) 1,000 mcg sublingual lozenge, Disp: , Rfl:   •  docusate sodium (Stool Softener) 100 MG capsule, stool softener capsules, Disp: , Rfl:   •  ELIQUIS 5 MG tablet tablet, TAKE 1 TABLET BY MOUTH TWICE DAILY, Disp: 180 tablet, Rfl: 1  •  FLUoxetine (PROzac) 40 MG capsule, TK ONE C PO  QAM, Disp: , Rfl: 2  •  fluticasone-salmeterol (WIXELA INHUB) 250-50 MCG/DOSE DISKUS, Wixela Inhub 250 mcg-50 mcg/dose powder  for inhalation, Disp: , Rfl:   •  Fluzone High-Dose Quadrivalent 0.7 ML suspension prefilled syringe, ADM 0.7ML IM UTD, Disp: , Rfl:   •  gabapentin (NEURONTIN) 400 MG capsule, TK 1 C PO BID FOR 30 DAYS UTD, Disp: , Rfl: 5  •  hepatitis A (Havrix) 1440 EL U/ML vaccine, Havrix (PF) 1,440 HENRY unit/mL intramuscular syringe, Disp: , Rfl:   •  influenza vac split high-dose (FLUZONE HIGH DOSE) 0.5 ML suspension prefilled syringe injection, Fluzone High-Dose 0143-8330 (PF) 180 mcg/0.5 mL intramuscular syringe, Disp: , Rfl:   •  methocarbamol (ROBAXIN) 750 MG tablet, methocarbamol 750 mg tablet  TAKE 1 TABLET BY MOUTH FOUR TIMES DAILY AS DIRECTED, Disp: , Rfl:   •  Methylnaltrexone Bromide (Relistor) 150 MG tablet, Relistor 150 mg tablet  Take 3 tablets every day by oral route as needed for 30 days., Disp: , Rfl:   •  oxybutynin XL (DITROPAN-XL) 10 MG 24 hr tablet, Take 10 mg by mouth Daily., Disp: , Rfl:   •  oxyCODONE-acetaminophen (PERCOCET) 7.5-325 MG per tablet, TK 1 T PO TID FOR 30 DAYS, Disp: , Rfl: 0  •  pantoprazole (PROTONIX) 20 MG EC tablet, TK 1 T PO D, Disp: , Rfl:   •  senna (senna) 8.6 MG tablet, Take 1 tablet by mouth Daily., Disp: , Rfl:   •  temazepam (RESTORIL) 30 MG capsule, TK ONE C PO  QHS, Disp: , Rfl: 5  •  traZODone (DESYREL) 100 MG tablet, TK 1 T PO  QHS, Disp: , Rfl: 3  •  venlafaxine XR (EFFEXOR-XR) 150 MG 24 hr capsule, venlafaxine  mg capsule,extended release 24 hr, Disp: , Rfl:     ALLERGIES:     Allergies   Allergen Reactions   • Aspirin Anaphylaxis   • Black [place] Pollen Allergy Skin Test Anaphylaxis   • Toradol [Ketorolac Tromethamine] Hallucinations              Vitals:    08/10/21 1629   Temp: 97.3 °F (36.3 °C)   TempSrc: Skin   Weight: Comment: unable     Current Status 7/13/2021   ECOG score 0        Physical Exam  Constitutional:       Appearance: She is well-developed.      Comments: Seated in a wheelchair, accompanied by her , in no acute distress   Pulmonary:       Effort: Pulmonary effort is normal. No respiratory distress.   Skin:     General: Skin is warm and dry.   Neurological:      Mental Status: She is alert and oriented to person, place, and time.     08/10/2021 physical exam unchanged    RECENT LABS:      Assessment/Plan     1.  Lifelong anticoagulation due to prior history of deep vein thromboses and very poor mobility and sedentary lifestyle.   Patient does continue Eliquis 5 mg twice daily was provided samples due to cost issues. She did have a recent hospitalization 4/14/21 with no bleeding issues however and was instructed to continue Eliquis at discharge.     2.  Prior history of iron deficiency requiring IV iron therapy in the setting of previous gastric bypass surgery.  She most recently received 2 doses of IV Injectafer in November 2019. Patient's hemoglobin did decline during her recent hospitalization however this is more than likely hemodilution due to the fluids they were giving her to correct her sodium level. She has been following with her PCP for this.    3.  Morbid obesity.  Patient is wheelchair bound.         PLAN:  1. I have provided the patient with a 1 month supply of Eliquis 5 mg tablets #56 today.  Patient is aware to take 1 tablet twice daily.  She is aware of the appropriate instructions for the medication ministration as well as dosing, and potential side effects.  She will call with any questions or concerns.  2. Patient will return in 1 month for follow-up visit with nurse practitioner for further Eliquis samples.      .

## 2021-09-02 ENCOUNTER — DOCUMENTATION (OUTPATIENT)
Dept: PHARMACY | Facility: HOSPITAL | Age: 71
End: 2021-09-02

## 2021-09-02 NOTE — PROGRESS NOTES
Site of Appt/Pickup: (y) [employer]; (n) [employer]; (n) [employer];    Prescriber (eliceo) requested the following medication samples to be given to the patient during the Nurse Practitioner visit.    NDC:  none  Drug name: eliquis  Strength: 5mg  Total Quantity:  56 (Containers- 4 X Units per Containers- 14)  Lot#:  [id number]  Expiration: 8/23    Product to be placed in the [employer] under 'Patient Specialty Medication' entry and is to be removed at time of visit.

## 2021-09-07 NOTE — PROGRESS NOTES
.     REASON FOR FOLLOW UP:   Management of anticoagulation-history of DVT poor mobility, sedentary lifestyle    HISTORY OF PRESENT ILLNESS:  The patient is a 70 y.o. year old female with a history of DVT on chronic anticoagulation with Eliquis 5 mg daily.  She is tolerating this quite well and denies any bleeding issues.  Patient is here today for Eliquis samples, which we are providing her with due to financial hardship.  She has no new concerns today.      Past Medical History:   Diagnosis Date   • Allergy    • Anemia    • Arthritis    • Asthma    • Bleeding disorder (CMS/HCC)    • Depression    • Excessive daytime sleepiness    • Fibromyalgia    • H/O DVT (deep venous thrombosis)    • H/O TIA (transient ischemic attack)    • Head trauma    • Migraine    • Panic attacks    • Poor sleep    • Poor vision    • Sleep apnea    • Stroke (CMS/HCC)        MEDICATIONS    Current Outpatient Medications:   •  albuterol (PROVENTIL) (2.5 MG/3ML) 0.083% nebulizer solution, albuterol sulfate 2.5 mg/3 mL (0.083 %) solution for nebulization, Disp: , Rfl:   •  albuterol sulfate HFA (VENTOLIN HFA) 108 (90 Base) MCG/ACT inhaler, Ventolin HFA 90 mcg/actuation aerosol inhaler, Disp: , Rfl:   •  ARIPiprazole (ABILIFY) 15 MG tablet, , Disp: , Rfl:   •  clonazePAM (KlonoPIN) 0.5 MG tablet, TK 1 T PO TID, Disp: , Rfl: 3  •  Cyanocobalamin 1000 MCG lozenge, cyanocobalamin (vit B-12) 1,000 mcg sublingual lozenge, Disp: , Rfl:   •  docusate sodium (Stool Softener) 100 MG capsule, stool softener capsules, Disp: , Rfl:   •  ELIQUIS 5 MG tablet tablet, TAKE 1 TABLET BY MOUTH TWICE DAILY, Disp: 180 tablet, Rfl: 1  •  FLUoxetine (PROzac) 40 MG capsule, TK ONE C PO  QAM, Disp: , Rfl: 2  •  fluticasone-salmeterol (WIXELA INHUB) 250-50 MCG/DOSE DISKUS, Wixela Inhub 250 mcg-50 mcg/dose powder for inhalation, Disp: , Rfl:   •  Fluzone High-Dose Quadrivalent 0.7 ML suspension prefilled syringe, ADM 0.7ML IM UTD, Disp: , Rfl:   •  gabapentin  "(NEURONTIN) 400 MG capsule, TK 1 C PO BID FOR 30 DAYS UTD, Disp: , Rfl: 5  •  hepatitis A (Havrix) 1440 EL U/ML vaccine, Havrix (PF) 1,440 HENRY unit/mL intramuscular syringe, Disp: , Rfl:   •  influenza vac split high-dose (FLUZONE HIGH DOSE) 0.5 ML suspension prefilled syringe injection, Fluzone High-Dose 0618-2520 (PF) 180 mcg/0.5 mL intramuscular syringe, Disp: , Rfl:   •  methocarbamol (ROBAXIN) 750 MG tablet, methocarbamol 750 mg tablet  TAKE 1 TABLET BY MOUTH FOUR TIMES DAILY AS DIRECTED, Disp: , Rfl:   •  Methylnaltrexone Bromide (Relistor) 150 MG tablet, Relistor 150 mg tablet  Take 3 tablets every day by oral route as needed for 30 days., Disp: , Rfl:   •  oxybutynin XL (DITROPAN-XL) 10 MG 24 hr tablet, Take 10 mg by mouth Daily., Disp: , Rfl:   •  oxyCODONE-acetaminophen (PERCOCET) 7.5-325 MG per tablet, TK 1 T PO TID FOR 30 DAYS, Disp: , Rfl: 0  •  pantoprazole (PROTONIX) 20 MG EC tablet, TK 1 T PO D, Disp: , Rfl:   •  senna (senna) 8.6 MG tablet, Take 1 tablet by mouth Daily., Disp: , Rfl:   •  temazepam (RESTORIL) 30 MG capsule, TK ONE C PO  QHS, Disp: , Rfl: 5  •  traZODone (DESYREL) 100 MG tablet, TK 1 T PO  QHS, Disp: , Rfl: 3  •  venlafaxine XR (EFFEXOR-XR) 150 MG 24 hr capsule, venlafaxine  mg capsule,extended release 24 hr, Disp: , Rfl:     ALLERGIES:     Allergies   Allergen Reactions   • Aspirin Anaphylaxis   • Black [place] Pollen Allergy Skin Test Anaphylaxis   • Toradol [Ketorolac Tromethamine] Hallucinations          Vitals:    09/08/21 1630   BP: 105/62   Pulse: 60   Resp: 16   Temp: 97.3 °F (36.3 °C)   TempSrc: Temporal   SpO2: 91%   Weight: 97.5 kg (215 lb)  Comment: states   Height: 160 cm (62.99\")     Current Status 7/13/2021   ECOG score 0     Physical Exam  Constitutional:       Appearance: She is well-developed.      Comments: Seated in a wheelchair, accompanied by her , in no acute distress   Pulmonary:      Effort: Pulmonary effort is normal. No respiratory distress. "   Skin:     General: Skin is warm and dry.   Neurological:      Mental Status: She is alert and oriented to person, place, and time.       RECENT LABS:      Assessment/Plan     1.  Lifelong anticoagulation due to prior history of deep vein thromboses and very poor mobility and sedentary lifestyle.   Patient does continue Eliquis 5 mg twice daily.  We provide the patient with samples due to cost issues. She did have a recent hospitalization 4/14/21 with no bleeding issues however and was instructed to continue Eliquis at discharge. She is provided with another 1 month supply of samples today.    2.  Prior history of iron deficiency requiring IV iron therapy in the setting of previous gastric bypass surgery.  She most recently received 2 doses of IV Injectafer in November 2019. Patient's hemoglobin did decline during her recent hospitalization however this is more than likely hemodilution due to the fluids they were giving her to correct her sodium level. She has been following with her PCP for this.    3.  Morbid obesity.  Patient is wheelchair bound.    PLAN:  1. I have provided the patient with a 1 month supply of Eliquis 5 mg tablets #56 today.  Patient is aware to take 1 tablet twice daily.  She is aware of the appropriate instructions for the medication ministration as well as dosing, and potential side effects.  She will call with any questions or concerns.  2. Patient will return in 1 month for follow-up visit with nurse practitioner for further Eliquis samples.      LYDIA Connor  09/08/2021

## 2021-09-08 ENCOUNTER — OFFICE VISIT (OUTPATIENT)
Dept: ONCOLOGY | Facility: CLINIC | Age: 71
End: 2021-09-08

## 2021-09-08 ENCOUNTER — APPOINTMENT (OUTPATIENT)
Dept: OTHER | Facility: HOSPITAL | Age: 71
End: 2021-09-08

## 2021-09-08 VITALS
BODY MASS INDEX: 38.09 KG/M2 | RESPIRATION RATE: 16 BRPM | HEIGHT: 63 IN | OXYGEN SATURATION: 91 % | WEIGHT: 215 LBS | TEMPERATURE: 97.3 F | DIASTOLIC BLOOD PRESSURE: 62 MMHG | SYSTOLIC BLOOD PRESSURE: 105 MMHG | HEART RATE: 60 BPM

## 2021-09-08 DIAGNOSIS — Z86.718 HISTORY OF DVT IN ADULTHOOD: ICD-10-CM

## 2021-09-08 DIAGNOSIS — Z79.01 ANTICOAGULATION MANAGEMENT ENCOUNTER: Primary | ICD-10-CM

## 2021-09-08 DIAGNOSIS — Z51.81 ANTICOAGULATION MANAGEMENT ENCOUNTER: Primary | ICD-10-CM

## 2021-09-08 PROCEDURE — 99213 OFFICE O/P EST LOW 20 MIN: CPT | Performed by: NURSE PRACTITIONER

## 2021-10-01 ENCOUNTER — DOCUMENTATION (OUTPATIENT)
Dept: PHARMACY | Facility: HOSPITAL | Age: 71
End: 2021-10-01

## 2021-10-01 NOTE — PROGRESS NOTES
Site of Appt/Pickup: (y) [facility]; (n) [email or web address]; (n) [facility];    Prescriber (eliceo) requested the following medication samples to be given to the patient during the Nurse Practitioner visit.    NDC:  none  Drug name: eliquis  Strength: 5mg  Total Quantity:  56 (Containers- 4 X Units per Containers- 14)  Lot#:  [id number]  Expiration: 8/23    Product to be placed in the [facility] under 'Patient Specialty Medication' entry and is to be removed at time of visit.

## 2021-10-05 NOTE — PROGRESS NOTES
.     REASON FOR FOLLOW UP:   Management of anticoagulation-history of DVT poor mobility, sedentary lifestyle    HISTORY OF PRESENT ILLNESS:  The patient is a 70 y.o. year old female with a history of DVT on chronic anticoagulation with Eliquis 5 mg daily.  She is tolerating this quite well and denies any bleeding issues.  Patient is here today for Eliquis samples, which we are providing her with due to financial hardship.  She has no new concerns today.      Past Medical History:   Diagnosis Date   • Allergy    • Anemia    • Arthritis    • Asthma    • Bleeding disorder (HCC)    • Depression    • Excessive daytime sleepiness    • Fibromyalgia    • H/O DVT (deep venous thrombosis)    • H/O TIA (transient ischemic attack)    • Head trauma    • Migraine    • Panic attacks    • Poor sleep    • Poor vision    • Sleep apnea    • Stroke (HCC)        MEDICATIONS    Current Outpatient Medications:   •  albuterol (PROVENTIL) (2.5 MG/3ML) 0.083% nebulizer solution, albuterol sulfate 2.5 mg/3 mL (0.083 %) solution for nebulization, Disp: , Rfl:   •  albuterol sulfate HFA (VENTOLIN HFA) 108 (90 Base) MCG/ACT inhaler, Ventolin HFA 90 mcg/actuation aerosol inhaler, Disp: , Rfl:   •  ARIPiprazole (ABILIFY) 15 MG tablet, , Disp: , Rfl:   •  clonazePAM (KlonoPIN) 0.5 MG tablet, TK 1 T PO TID, Disp: , Rfl: 3  •  Cyanocobalamin 1000 MCG lozenge, cyanocobalamin (vit B-12) 1,000 mcg sublingual lozenge, Disp: , Rfl:   •  docusate sodium (Stool Softener) 100 MG capsule, stool softener capsules, Disp: , Rfl:   •  ELIQUIS 5 MG tablet tablet, TAKE 1 TABLET BY MOUTH TWICE DAILY, Disp: 180 tablet, Rfl: 1  •  FLUoxetine (PROzac) 40 MG capsule, TK ONE C PO  QAM, Disp: , Rfl: 2  •  fluticasone-salmeterol (WIXELA INHUB) 250-50 MCG/DOSE DISKUS, Wixela Inhub 250 mcg-50 mcg/dose powder for inhalation, Disp: , Rfl:   •  Fluzone High-Dose Quadrivalent 0.7 ML suspension prefilled syringe, ADM 0.7ML IM UTD, Disp: , Rfl:   •  gabapentin (NEURONTIN) 400  "MG capsule, TK 1 C PO BID FOR 30 DAYS UTD, Disp: , Rfl: 5  •  hepatitis A (Havrix) 1440 EL U/ML vaccine, Havrix (PF) 1,440 HENRY unit/mL intramuscular syringe, Disp: , Rfl:   •  influenza vac split high-dose (FLUZONE HIGH DOSE) 0.5 ML suspension prefilled syringe injection, Fluzone High-Dose 7758-0268 (PF) 180 mcg/0.5 mL intramuscular syringe, Disp: , Rfl:   •  methocarbamol (ROBAXIN) 750 MG tablet, methocarbamol 750 mg tablet  TAKE 1 TABLET BY MOUTH FOUR TIMES DAILY AS DIRECTED, Disp: , Rfl:   •  Methylnaltrexone Bromide (Relistor) 150 MG tablet, Relistor 150 mg tablet  Take 3 tablets every day by oral route as needed for 30 days., Disp: , Rfl:   •  oxybutynin XL (DITROPAN-XL) 10 MG 24 hr tablet, Take 10 mg by mouth Daily., Disp: , Rfl:   •  oxyCODONE-acetaminophen (PERCOCET) 7.5-325 MG per tablet, TK 1 T PO TID FOR 30 DAYS, Disp: , Rfl: 0  •  pantoprazole (PROTONIX) 20 MG EC tablet, TK 1 T PO D, Disp: , Rfl:   •  senna (senna) 8.6 MG tablet, Take 1 tablet by mouth Daily., Disp: , Rfl:   •  temazepam (RESTORIL) 30 MG capsule, TK ONE C PO  QHS, Disp: , Rfl: 5  •  traZODone (DESYREL) 100 MG tablet, TK 1 T PO  QHS, Disp: , Rfl: 3  •  venlafaxine XR (EFFEXOR-XR) 150 MG 24 hr capsule, venlafaxine  mg capsule,extended release 24 hr, Disp: , Rfl:     ALLERGIES:     Allergies   Allergen Reactions   • Aspirin Anaphylaxis   • Black [place] Pollen Allergy Skin Test Anaphylaxis   • Toradol [Ketorolac Tromethamine] Hallucinations          Vitals:    10/06/21 1608   BP: 102/70   Pulse: 64   Resp: 16   Temp: 97.1 °F (36.2 °C)   TempSrc: Temporal   SpO2: 95%   Height: 160 cm (62.99\")   PainSc:   4   PainLoc: Back     Current Status 7/13/2021   ECOG score 0     Physical Exam  Constitutional:       Appearance: She is well-developed.      Comments: Seated in a wheelchair, accompanied by her , in no acute distress   Pulmonary:      Effort: Pulmonary effort is normal. No respiratory distress.   Skin:     General: Skin is " warm and dry.   Neurological:      Mental Status: She is alert and oriented to person, place, and time.       RECENT LABS:    Assessment/Plan     1.  Lifelong anticoagulation due to prior history of deep vein thromboses and very poor mobility and sedentary lifestyle.   Patient does continue Eliquis 5 mg twice daily.  We provide the patient with samples due to cost issues. She did have a recent hospitalization 4/14/21 with no bleeding issues however and was instructed to continue Eliquis at discharge. She is provided with another 1 month supply of samples today.     2.  Prior history of iron deficiency requiring IV iron therapy in the setting of previous gastric bypass surgery.  She most recently received 2 doses of IV Injectafer in November 2019. Patient's hemoglobin did decline during her recent hospitalization however this is more than likely hemodilution due to the fluids they were giving her to correct her sodium level. She has been following with her PCP for this.    3.  Morbid obesity.  Patient is wheelchair bound.    PLAN:   1. I have provided the patient with a 1 month supply of Eliquis 5 mg tablets #56 today.  Patient is aware to take 1 tablet twice daily.  She is aware of the appropriate instructions for the medication ministration as well as dosing, and potential side effects.  She will call with any questions or concerns.  2. Patient will return in 1 month for follow-up visit with nurse practitioner for further Eliquis samples.      LYDIA Connor  10/06/2021

## 2021-10-06 ENCOUNTER — APPOINTMENT (OUTPATIENT)
Dept: OTHER | Facility: HOSPITAL | Age: 71
End: 2021-10-06

## 2021-10-06 ENCOUNTER — OFFICE VISIT (OUTPATIENT)
Dept: ONCOLOGY | Facility: CLINIC | Age: 71
End: 2021-10-06

## 2021-10-06 VITALS
BODY MASS INDEX: 38.1 KG/M2 | DIASTOLIC BLOOD PRESSURE: 70 MMHG | TEMPERATURE: 97.1 F | RESPIRATION RATE: 16 BRPM | SYSTOLIC BLOOD PRESSURE: 102 MMHG | HEIGHT: 63 IN | HEART RATE: 64 BPM | OXYGEN SATURATION: 95 %

## 2021-10-06 DIAGNOSIS — Z86.718 HISTORY OF DVT IN ADULTHOOD: ICD-10-CM

## 2021-10-06 DIAGNOSIS — Z79.01 CHRONIC ANTICOAGULATION: ICD-10-CM

## 2021-10-06 DIAGNOSIS — Z79.01 ANTICOAGULATION MANAGEMENT ENCOUNTER: Primary | ICD-10-CM

## 2021-10-06 DIAGNOSIS — Z51.81 ANTICOAGULATION MANAGEMENT ENCOUNTER: Primary | ICD-10-CM

## 2021-10-06 PROCEDURE — 99213 OFFICE O/P EST LOW 20 MIN: CPT | Performed by: NURSE PRACTITIONER

## 2021-10-27 ENCOUNTER — DOCUMENTATION (OUTPATIENT)
Dept: PHARMACY | Facility: HOSPITAL | Age: 71
End: 2021-10-27

## 2021-10-27 NOTE — PROGRESS NOTES
Site of Appt/Pickup: (y) [employer]; (n) [employer]; (n) [employer];    Prescriber (eliceo) requested the following medication samples to be given to the patient during the Nurse Practitioner visit.    NDC:  none  Drug name: eliquis  Strength: 5mg  Total Quantity:  56 (Containers- 4 X Units per Containers- 14)  Lot#:  [id number]  Expiration: 11/23    Product to be placed in the [employer] under 'Patient Specialty Medication' entry and is to be removed at time of visit.

## 2021-11-01 NOTE — PROGRESS NOTES
.     REASON FOR FOLLOW UP:   Management of anticoagulation-history of DVT poor mobility, sedentary lifestyle    HISTORY OF PRESENT ILLNESS:  The patient is a 70 y.o. year old female with a history of DVT on chronic anticoagulation with Eliquis 5 mg daily.  She is tolerating this quite well and denies any bleeding issues.  Patient is here today for Eliquis samples, which we are providing her with due to financial hardship.  She has no new concerns today.      Past Medical History:   Diagnosis Date   • Allergy    • Anemia    • Arthritis    • Asthma    • Bleeding disorder (HCC)    • Depression    • Excessive daytime sleepiness    • Fibromyalgia    • H/O DVT (deep venous thrombosis)    • H/O TIA (transient ischemic attack)    • Head trauma    • Migraine    • Panic attacks    • Poor sleep    • Poor vision    • Sleep apnea    • Stroke (HCC)        MEDICATIONS    Current Outpatient Medications:   •  albuterol (PROVENTIL) (2.5 MG/3ML) 0.083% nebulizer solution, albuterol sulfate 2.5 mg/3 mL (0.083 %) solution for nebulization, Disp: , Rfl:   •  albuterol sulfate HFA (VENTOLIN HFA) 108 (90 Base) MCG/ACT inhaler, Ventolin HFA 90 mcg/actuation aerosol inhaler, Disp: , Rfl:   •  ARIPiprazole (ABILIFY) 15 MG tablet, , Disp: , Rfl:   •  clonazePAM (KlonoPIN) 0.5 MG tablet, TK 1 T PO TID, Disp: , Rfl: 3  •  Cyanocobalamin 1000 MCG lozenge, cyanocobalamin (vit B-12) 1,000 mcg sublingual lozenge, Disp: , Rfl:   •  docusate sodium (Stool Softener) 100 MG capsule, stool softener capsules, Disp: , Rfl:   •  ELIQUIS 5 MG tablet tablet, TAKE 1 TABLET BY MOUTH TWICE DAILY, Disp: 180 tablet, Rfl: 1  •  FLUoxetine (PROzac) 40 MG capsule, TK ONE C PO  QAM, Disp: , Rfl: 2  •  fluticasone-salmeterol (WIXELA INHUB) 250-50 MCG/DOSE DISKUS, Wixela Inhub 250 mcg-50 mcg/dose powder for inhalation, Disp: , Rfl:   •  Fluzone High-Dose Quadrivalent 0.7 ML suspension prefilled syringe, ADM 0.7ML IM UTD, Disp: , Rfl:   •  gabapentin (NEURONTIN) 400  MG capsule, TK 1 C PO BID FOR 30 DAYS UTD, Disp: , Rfl: 5  •  hepatitis A (Havrix) 1440 EL U/ML vaccine, Havrix (PF) 1,440 HENRY unit/mL intramuscular syringe, Disp: , Rfl:   •  influenza vac split high-dose (FLUZONE HIGH DOSE) 0.5 ML suspension prefilled syringe injection, Fluzone High-Dose 6207-9543 (PF) 180 mcg/0.5 mL intramuscular syringe, Disp: , Rfl:   •  methocarbamol (ROBAXIN) 750 MG tablet, methocarbamol 750 mg tablet  TAKE 1 TABLET BY MOUTH FOUR TIMES DAILY AS DIRECTED, Disp: , Rfl:   •  Methylnaltrexone Bromide (Relistor) 150 MG tablet, Relistor 150 mg tablet  Take 3 tablets every day by oral route as needed for 30 days., Disp: , Rfl:   •  oxybutynin XL (DITROPAN-XL) 10 MG 24 hr tablet, Take 10 mg by mouth Daily., Disp: , Rfl:   •  oxyCODONE-acetaminophen (PERCOCET) 7.5-325 MG per tablet, TK 1 T PO TID FOR 30 DAYS, Disp: , Rfl: 0  •  pantoprazole (PROTONIX) 20 MG EC tablet, TK 1 T PO D, Disp: , Rfl:   •  senna (senna) 8.6 MG tablet, Take 1 tablet by mouth Daily., Disp: , Rfl:   •  temazepam (RESTORIL) 30 MG capsule, TK ONE C PO  QHS, Disp: , Rfl: 5  •  traZODone (DESYREL) 100 MG tablet, TK 1 T PO  QHS, Disp: , Rfl: 3  •  venlafaxine XR (EFFEXOR-XR) 150 MG 24 hr capsule, venlafaxine  mg capsule,extended release 24 hr, Disp: , Rfl:     ALLERGIES:     Allergies   Allergen Reactions   • Aspirin Anaphylaxis   • Black [place] Pollen Allergy Skin Test Anaphylaxis   • Toradol [Ketorolac Tromethamine] Hallucinations          There were no vitals filed for this visit.  Current Status 7/13/2021   ECOG score 0     Physical Exam  Constitutional:       Appearance: She is well-developed.      Comments: Seated in a wheelchair, accompanied by her , in no acute distress   Pulmonary:      Effort: Pulmonary effort is normal. No respiratory distress.   Skin:     General: Skin is warm and dry.   Neurological:      Mental Status: She is alert and oriented to person, place, and time.       RECENT  LABS:    Assessment/Plan     1.  Lifelong anticoagulation due to prior history of deep vein thromboses and very poor mobility and sedentary lifestyle.   Patient does continue Eliquis 5 mg twice daily.  We provide the patient with samples due to cost issues. She did have a recent hospitalization 4/14/21 with no bleeding issues however and was instructed to continue Eliquis at discharge. She is provided with another 1 month supply of samples today.     2.  Prior history of iron deficiency requiring IV iron therapy in the setting of previous gastric bypass surgery.  She most recently received 2 doses of IV Injectafer in November 2019. Patient's hemoglobin did decline during her recent hospitalization however this is more than likely hemodilution due to the fluids they were giving her to correct her sodium level. She has been following with her PCP for this.    3.  Morbid obesity.  Patient is wheelchair bound.    PLAN:   1. I have provided the patient with a 1 month supply of Eliquis 5 mg tablets #56 today.  Patient is aware to take 1 tablet twice daily.  She is aware of the appropriate instructions for the medication ministration as well as dosing, and potential side effects.  She will call with any questions or concerns.  2. Patient will return in 1 month for follow-up visit with nurse practitioner for further Eliquis samples.      LYDIA Connor  11/03/2021

## 2021-11-03 ENCOUNTER — OFFICE VISIT (OUTPATIENT)
Dept: ONCOLOGY | Facility: CLINIC | Age: 71
End: 2021-11-03

## 2021-11-03 ENCOUNTER — APPOINTMENT (OUTPATIENT)
Dept: OTHER | Facility: HOSPITAL | Age: 71
End: 2021-11-03

## 2021-11-03 VITALS
BODY MASS INDEX: 38.25 KG/M2 | TEMPERATURE: 97.5 F | DIASTOLIC BLOOD PRESSURE: 60 MMHG | HEART RATE: 65 BPM | WEIGHT: 215.9 LBS | SYSTOLIC BLOOD PRESSURE: 102 MMHG | HEIGHT: 63 IN | RESPIRATION RATE: 16 BRPM | OXYGEN SATURATION: 91 %

## 2021-11-03 DIAGNOSIS — Z79.01 CHRONIC ANTICOAGULATION: ICD-10-CM

## 2021-11-03 DIAGNOSIS — Z51.81 ANTICOAGULATION MANAGEMENT ENCOUNTER: Primary | ICD-10-CM

## 2021-11-03 DIAGNOSIS — Z79.01 ANTICOAGULATION MANAGEMENT ENCOUNTER: Primary | ICD-10-CM

## 2021-11-03 PROCEDURE — 99213 OFFICE O/P EST LOW 20 MIN: CPT | Performed by: NURSE PRACTITIONER

## 2021-11-03 RX ORDER — CIPROFLOXACIN 500 MG/1
TABLET, FILM COATED ORAL
COMMUNITY
Start: 2021-10-25 | End: 2021-12-01

## 2021-11-03 RX ORDER — MULTIVITAMIN
1 CAPSULE ORAL DAILY
COMMUNITY

## 2021-11-03 RX ORDER — FLUCONAZOLE 200 MG/1
TABLET ORAL
COMMUNITY
Start: 2021-07-30 | End: 2021-12-01

## 2021-11-03 RX ORDER — OMEGA-3 FATTY ACIDS CAP DELAYED RELEASE 1000 MG 1000 MG
CAPSULE DELAYED RELEASE ORAL DAILY
COMMUNITY

## 2021-11-03 RX ORDER — PANTOPRAZOLE SODIUM 40 MG/1
TABLET, DELAYED RELEASE ORAL
COMMUNITY
Start: 2021-10-21

## 2021-11-03 RX ORDER — FLUTICASONE PROPIONATE 50 MCG
SPRAY, SUSPENSION (ML) NASAL
COMMUNITY
Start: 2021-10-21

## 2021-11-03 RX ORDER — AMOXICILLIN 250 MG
CAPSULE ORAL
COMMUNITY

## 2021-11-03 RX ORDER — VENLAFAXINE HYDROCHLORIDE 75 MG/1
225 CAPSULE, EXTENDED RELEASE ORAL EVERY MORNING
COMMUNITY
Start: 2021-09-20

## 2021-11-24 ENCOUNTER — TELEPHONE (OUTPATIENT)
Dept: PHARMACY | Facility: HOSPITAL | Age: 71
End: 2021-11-24

## 2021-11-24 NOTE — TELEPHONE ENCOUNTER
Site of Appt/Pickup: (Y) [facility]; ([facility]; (N) [employer];    Prescriber (TRISHA TORRES) requested the following medication samples to be given to the patient during the Nurse Practitioner visit.    NDC:  N/A  Drug name: ELIQUIS  Strength: 5MG  Total Quantity:  56 (Containers- 4 X Units per Containers- 14)  Lot#:  [id number]  Expiration: 02/2024    Product to be placed in the [employer] under 'Patient Specialty Medication' entry and is to be removed at time of visit.

## 2021-12-01 ENCOUNTER — APPOINTMENT (OUTPATIENT)
Dept: OTHER | Facility: HOSPITAL | Age: 71
End: 2021-12-01

## 2021-12-01 ENCOUNTER — OFFICE VISIT (OUTPATIENT)
Dept: ONCOLOGY | Facility: CLINIC | Age: 71
End: 2021-12-01

## 2021-12-01 VITALS
BODY MASS INDEX: 37.46 KG/M2 | OXYGEN SATURATION: 90 % | TEMPERATURE: 98 F | SYSTOLIC BLOOD PRESSURE: 107 MMHG | WEIGHT: 211.4 LBS | HEART RATE: 60 BPM | RESPIRATION RATE: 16 BRPM | HEIGHT: 63 IN | DIASTOLIC BLOOD PRESSURE: 68 MMHG

## 2021-12-01 DIAGNOSIS — Z51.81 ANTICOAGULATION MANAGEMENT ENCOUNTER: Primary | ICD-10-CM

## 2021-12-01 DIAGNOSIS — Z79.01 ANTICOAGULATION MANAGEMENT ENCOUNTER: Primary | ICD-10-CM

## 2021-12-01 DIAGNOSIS — Z79.01 CHRONIC ANTICOAGULATION: ICD-10-CM

## 2021-12-01 PROCEDURE — 99213 OFFICE O/P EST LOW 20 MIN: CPT | Performed by: NURSE PRACTITIONER

## 2021-12-01 RX ORDER — NALOXEGOL OXALATE 25 MG/1
TABLET, FILM COATED ORAL
COMMUNITY
Start: 2021-11-12

## 2021-12-21 ENCOUNTER — TELEPHONE (OUTPATIENT)
Dept: PHARMACY | Facility: HOSPITAL | Age: 71
End: 2021-12-21

## 2021-12-21 NOTE — TELEPHONE ENCOUNTER
Site of Appt/Pickup: (Y) [place]; (NN) [employer]; (N) [employer];    Prescriber (TRISHA TORRES) requested the following medication samples to be given to the patient during the Nurse Practitioner visit.    NDC:  N/A  Drug name: ELIQUIS  Strength: 5MG  Total Quantity:  56 (Containers- 4 X Units per Containers- 14)  Lot#:  [id number]  Expiration: 04/2023    Product to be placed in the [employer] under 'Patient Specialty Medication' entry and is to be removed at time of visit.

## 2021-12-29 NOTE — PROGRESS NOTES
.     REASON FOR FOLLOW UP:   Management of anticoagulation-history of DVT poor mobility, sedentary lifestyle    HISTORY OF PRESENT ILLNESS:  The patient is a 71 y.o. year old female with a history of DVT on chronic anticoagulation with Eliquis 5 mg daily.  She is tolerating this quite well and aside from bruising easily.  Patient is here today for Eliquis samples, which we are providing her with due to financial hardship.  She has no new concerns today.     Past Medical History:   Diagnosis Date   • Allergy    • Anemia    • Arthritis    • Asthma    • Bleeding disorder (HCC)    • Depression    • Excessive daytime sleepiness    • Fibromyalgia    • H/O DVT (deep venous thrombosis)    • H/O TIA (transient ischemic attack)    • Head trauma    • Migraine    • Panic attacks    • Poor sleep    • Poor vision    • Sleep apnea    • Stroke (HCC)        MEDICATIONS    Current Outpatient Medications:   •  albuterol (PROVENTIL) (2.5 MG/3ML) 0.083% nebulizer solution, albuterol sulfate 2.5 mg/3 mL (0.083 %) solution for nebulization, Disp: , Rfl:   •  albuterol sulfate HFA (VENTOLIN HFA) 108 (90 Base) MCG/ACT inhaler, Ventolin HFA 90 mcg/actuation aerosol inhaler, Disp: , Rfl:   •  ARIPiprazole (ABILIFY) 15 MG tablet, , Disp: , Rfl:   •  clonazePAM (KlonoPIN) 0.5 MG tablet, TK 1 T PO TID, Disp: , Rfl: 3  •  Cyanocobalamin 1000 MCG lozenge, cyanocobalamin (vit B-12) 1,000 mcg sublingual lozenge, Disp: , Rfl:   •  docusate sodium (Stool Softener) 100 MG capsule, stool softener capsules, Disp: , Rfl:   •  ELIQUIS 5 MG tablet tablet, TAKE 1 TABLET BY MOUTH TWICE DAILY, Disp: 180 tablet, Rfl: 1  •  FLUoxetine (PROzac) 40 MG capsule, TK ONE C PO  QAM, Disp: , Rfl: 2  •  fluticasone (FLONASE) 50 MCG/ACT nasal spray, INSTILL 1 SPRAY INTO EACH NOSTRIL TWICE DAILY, Disp: , Rfl:   •  fluticasone-salmeterol (WIXELA INHUB) 250-50 MCG/DOSE DISKUS, Wixela Inhub 250 mcg-50 mcg/dose powder for inhalation, Disp: , Rfl:   •  Fluzone High-Dose  Quadrivalent 0.7 ML suspension prefilled syringe, ADM 0.7ML IM UTD, Disp: , Rfl:   •  gabapentin (NEURONTIN) 400 MG capsule, TK 1 C PO BID FOR 30 DAYS UTD, Disp: , Rfl: 5  •  hepatitis A (Havrix) 1440 EL U/ML vaccine, Havrix (PF) 1,440 HENRY unit/mL intramuscular syringe, Disp: , Rfl:   •  influenza vac split high-dose (FLUZONE HIGH DOSE) 0.5 ML suspension prefilled syringe injection, Fluzone High-Dose 7361-4863 (PF) 180 mcg/0.5 mL intramuscular syringe, Disp: , Rfl:   •  methocarbamol (ROBAXIN) 750 MG tablet, methocarbamol 750 mg tablet  TAKE 1 TABLET BY MOUTH FOUR TIMES DAILY AS DIRECTED, Disp: , Rfl:   •  Methylnaltrexone Bromide (Relistor) 150 MG tablet, Relistor 150 mg tablet  Take 3 tablets every day by oral route as needed for 30 days., Disp: , Rfl:   •  Movantik 25 MG tablet, TAKE 1 TABLET BY MOUTH 1 TIME EACH DAY BEFORE BREAKFAST, Disp: , Rfl:   •  Multiple Vitamin (multivitamin) capsule, Take 1 capsule by mouth Daily., Disp: , Rfl:   •  Omega-3 Fatty Acids (Fish Oil) 1000 MG capsule delayed-release, Take  by mouth Daily., Disp: , Rfl:   •  oxyCODONE-acetaminophen (PERCOCET) 7.5-325 MG per tablet, TK 1 T PO TID FOR 30 DAYS, Disp: , Rfl: 0  •  pantoprazole (PROTONIX) 40 MG EC tablet, , Disp: , Rfl:   •  sennosides-docusate (PERICOLACE) 8.6-50 MG per tablet, Senna Plus 8.6 mg-50 mg tablet  TAKE 2 TABLETS BY MOUTH EVERY DAY AS NEEDED, Disp: , Rfl:   •  temazepam (RESTORIL) 30 MG capsule, TK ONE C PO  QHS, Disp: , Rfl: 5  •  traZODone (DESYREL) 100 MG tablet, TK 1 T PO  QHS, Disp: , Rfl: 3  •  venlafaxine XR (EFFEXOR-XR) 75 MG 24 hr capsule, Take 225 mg by mouth Every Morning., Disp: , Rfl:     ALLERGIES:     Allergies   Allergen Reactions   • Aspirin Anaphylaxis   • [place] Pollen Allergy Skin Test Anaphylaxis   • Toradol [Ketorolac Tromethamine] Hallucinations          Vitals:    12/30/21 1504   BP: 102/69   Pulse: 73   Resp: 16   Temp: 96.9 °F (36.1 °C)   TempSrc: Temporal   SpO2: 94%   Weight: 93.4 kg  "(206 lb)   Height: 160 cm (62.99\")   PainSc:   6   PainLoc: Back  Comment: LOWER     Current Status 12/1/2021   ECOG score 1     Physical Exam  Constitutional:       Appearance: She is well-developed.      Comments: Seated in a wheelchair, accompanied by her , in no acute distress   Pulmonary:      Effort: Pulmonary effort is normal. No respiratory distress.   Skin:     General: Skin is warm and dry.   Neurological:      Mental Status: She is alert and oriented to person, place, and time.       RECENT LABS:    Assessment/Plan     1.  Lifelong anticoagulation due to prior history of deep vein thromboses and very poor mobility and sedentary lifestyle.   Patient does continue Eliquis 5 mg twice daily.  We provide the patient with samples due to cost issues. She did have a recent hospitalization 4/14/21 with no bleeding issues however and was instructed to continue Eliquis at discharge. She is provided with another 1 month supply of samples today.     2.  Prior history of iron deficiency requiring IV iron therapy in the setting of previous gastric bypass surgery.  She most recently received 2 doses of IV Injectafer in November 2019. Patient's hemoglobin did decline during her recent hospitalization however this is more than likely hemodilution due to the fluids they were giving her to correct her sodium level. She has been following with her PCP for this.    3.  Morbid obesity.  Patient is wheelchair bound.    PLAN:   1. I have provided the patient with a 1 month supply of Eliquis 5 mg tablets #56 today.  Patient is aware to take 1 tablet twice daily.  She is aware of the appropriate instructions for the medication ministration as well as dosing, and potential side effects.  She will call with any questions or concerns.  2. She will continue to return on a monthly basis for nurse practitioner visit and further samples of Eliquis.    LYDIA Connor  12/30/21          "

## 2021-12-30 ENCOUNTER — TELEPHONE (OUTPATIENT)
Dept: ONCOLOGY | Facility: CLINIC | Age: 71
End: 2021-12-30

## 2021-12-30 ENCOUNTER — OFFICE VISIT (OUTPATIENT)
Dept: ONCOLOGY | Facility: CLINIC | Age: 71
End: 2021-12-30

## 2021-12-30 ENCOUNTER — APPOINTMENT (OUTPATIENT)
Dept: OTHER | Facility: HOSPITAL | Age: 71
End: 2021-12-30

## 2021-12-30 VITALS
WEIGHT: 206 LBS | TEMPERATURE: 96.9 F | RESPIRATION RATE: 16 BRPM | BODY MASS INDEX: 36.5 KG/M2 | HEIGHT: 63 IN | HEART RATE: 73 BPM | SYSTOLIC BLOOD PRESSURE: 102 MMHG | DIASTOLIC BLOOD PRESSURE: 69 MMHG | OXYGEN SATURATION: 94 %

## 2021-12-30 DIAGNOSIS — Z79.01 CHRONIC ANTICOAGULATION: ICD-10-CM

## 2021-12-30 DIAGNOSIS — Z51.81 ANTICOAGULATION MANAGEMENT ENCOUNTER: Primary | ICD-10-CM

## 2021-12-30 DIAGNOSIS — Z79.01 ANTICOAGULATION MANAGEMENT ENCOUNTER: Primary | ICD-10-CM

## 2022-01-19 ENCOUNTER — TELEPHONE (OUTPATIENT)
Dept: PHARMACY | Facility: HOSPITAL | Age: 72
End: 2022-01-19

## 2022-01-19 NOTE — TELEPHONE ENCOUNTER
Site of Appt/Pickup: (Y) [facility]; [facility] [place];    Prescriber (TRISHA TORRES) requested the following medication samples to be given to the patient during the Nurse Practitioner visit.    NDC:  N/A  Drug name: ELIQUIS  Strength: 5MG  Total Quantity:  56 (Containers- 4 X Units per Containers- 14)  Lot#:  [id number]  Expiration: 10/2023    Product to be placed in the [facility] under 'Patient Specialty Medication' entry and is to be removed at time of visit.

## 2022-01-25 RX ORDER — APIXABAN 5 MG/1
5 TABLET, FILM COATED ORAL 2 TIMES DAILY
Qty: 60 TABLET | Refills: 0 | COMMUNITY
Start: 2022-01-26 | End: 2022-02-23 | Stop reason: SDUPTHER

## 2022-01-26 ENCOUNTER — APPOINTMENT (OUTPATIENT)
Dept: OTHER | Facility: HOSPITAL | Age: 72
End: 2022-01-26

## 2022-02-23 ENCOUNTER — APPOINTMENT (OUTPATIENT)
Dept: OTHER | Facility: HOSPITAL | Age: 72
End: 2022-02-23

## 2022-02-23 ENCOUNTER — TELEPHONE (OUTPATIENT)
Dept: ONCOLOGY | Facility: CLINIC | Age: 72
End: 2022-02-23

## 2022-02-23 RX ORDER — APIXABAN 5 MG/1
5 TABLET, FILM COATED ORAL 2 TIMES DAILY
Qty: 56 TABLET | Refills: 0 | COMMUNITY
Start: 2022-02-23

## 2022-02-23 NOTE — TELEPHONE ENCOUNTER
0934 - spoke with the pt and her  to let her know that we won't be able to continue to give the pt samples since she isn't following with our providers anymore. Let them know that Dr. Mosley's last office note from 01/2021 deferred follow ups to her PCP. Informed them that the pt will need to get samples from her PCP's office moving forward. pts  asked if this RN would call the PCPs office to discuss the transfer, this RN agreed.     0938 - This RN spoke with Stephanie from Dr. Pollack office. This RN explained that the pt is not currently following with our practice so the pt will need to get her Eliquis samples from their office. Stephanie repeated back the pts Eliquis dosing and v/u. She will transfer this to the appropriate parties and get the pt set up for a sample appointment.     1140 - This RN informed the pt and  that we have talked to Dr. Pollack office about moving sample administration to them and they will reach out to the pt to set up a sample appointment. They v/u and thanked this RN and office for the pts care.

## 2022-02-23 NOTE — TELEPHONE ENCOUNTER
----- Message from Dana Thomas RPH sent at 2/23/2022  9:20 AM EST -----  Unfortunately, if we are not the ones following the patient, we cannot fill for them.  They need to be active in the practice and seeing one of our physicians on a regular basis. The PCP will need to help in th is situation.    Thanks, Vidhi  ----- Message -----  From: Izabella Khalil RN  Sent: 2/23/2022   9:16 AM EST  To: [facility] Oral Onc Pool    Hello,  So this patient has been coming in the last year for samples only, no MD/NP follow ups. Her last follow up with Dr. Mosley was in 01/2021. He stated she would follow up with her PCP and only see him as needed,so I am assuming that her PCP should've taken over all her medications. She is scheduled to come in today, but without MD follow ups I don't think she can receive the medication. I was told by the MA's that you all are calling the patients and explaining how the new sample process works. I guess I am trying to figure out what we do moving forward and who needs to call and tell the patient what we decide.     Thank you all for your help!